# Patient Record
Sex: FEMALE | Race: WHITE | HISPANIC OR LATINO | Employment: FULL TIME | URBAN - METROPOLITAN AREA
[De-identification: names, ages, dates, MRNs, and addresses within clinical notes are randomized per-mention and may not be internally consistent; named-entity substitution may affect disease eponyms.]

---

## 2023-11-16 ENCOUNTER — APPOINTMENT (OUTPATIENT)
Dept: RADIOLOGY | Facility: MEDICAL CENTER | Age: 23
End: 2023-11-16
Attending: EMERGENCY MEDICINE
Payer: COMMERCIAL

## 2023-11-16 ENCOUNTER — HOSPITAL ENCOUNTER (EMERGENCY)
Facility: MEDICAL CENTER | Age: 23
End: 2023-11-16
Attending: EMERGENCY MEDICINE
Payer: COMMERCIAL

## 2023-11-16 VITALS
HEIGHT: 63 IN | RESPIRATION RATE: 16 BRPM | DIASTOLIC BLOOD PRESSURE: 59 MMHG | TEMPERATURE: 98.1 F | HEART RATE: 74 BPM | SYSTOLIC BLOOD PRESSURE: 108 MMHG | OXYGEN SATURATION: 97 %

## 2023-11-16 DIAGNOSIS — N83.202 CYST OF LEFT OVARY: ICD-10-CM

## 2023-11-16 DIAGNOSIS — R10.30 LOWER ABDOMINAL PAIN: ICD-10-CM

## 2023-11-16 LAB
ALBUMIN SERPL BCP-MCNC: 4.3 G/DL (ref 3.2–4.9)
ALBUMIN/GLOB SERPL: 1.2 G/DL
ALP SERPL-CCNC: 74 U/L (ref 30–99)
ALT SERPL-CCNC: 18 U/L (ref 2–50)
ANION GAP SERPL CALC-SCNC: 8 MMOL/L (ref 7–16)
APPEARANCE UR: CLEAR
AST SERPL-CCNC: 19 U/L (ref 12–45)
BASOPHILS # BLD AUTO: 0.7 % (ref 0–1.8)
BASOPHILS # BLD: 0.04 K/UL (ref 0–0.12)
BILIRUB SERPL-MCNC: 0.2 MG/DL (ref 0.1–1.5)
BILIRUB UR QL STRIP.AUTO: NEGATIVE
BUN SERPL-MCNC: 12 MG/DL (ref 8–22)
CALCIUM ALBUM COR SERPL-MCNC: 8.9 MG/DL (ref 8.5–10.5)
CALCIUM SERPL-MCNC: 9.1 MG/DL (ref 8.5–10.5)
CHLORIDE SERPL-SCNC: 103 MMOL/L (ref 96–112)
CO2 SERPL-SCNC: 25 MMOL/L (ref 20–33)
COLOR UR: YELLOW
CREAT SERPL-MCNC: 0.67 MG/DL (ref 0.5–1.4)
EKG IMPRESSION: NORMAL
EOSINOPHIL # BLD AUTO: 0.19 K/UL (ref 0–0.51)
EOSINOPHIL NFR BLD: 3.4 % (ref 0–6.9)
ERYTHROCYTE [DISTWIDTH] IN BLOOD BY AUTOMATED COUNT: 41 FL (ref 35.9–50)
GFR SERPLBLD CREATININE-BSD FMLA CKD-EPI: 124 ML/MIN/1.73 M 2
GLOBULIN SER CALC-MCNC: 3.7 G/DL (ref 1.9–3.5)
GLUCOSE SERPL-MCNC: 96 MG/DL (ref 65–99)
GLUCOSE UR STRIP.AUTO-MCNC: NEGATIVE MG/DL
HCG UR QL: NEGATIVE
HCT VFR BLD AUTO: 45.4 % (ref 37–47)
HGB BLD-MCNC: 15.3 G/DL (ref 12–16)
IMM GRANULOCYTES # BLD AUTO: 0.01 K/UL (ref 0–0.11)
IMM GRANULOCYTES NFR BLD AUTO: 0.2 % (ref 0–0.9)
KETONES UR STRIP.AUTO-MCNC: NEGATIVE MG/DL
LEUKOCYTE ESTERASE UR QL STRIP.AUTO: NEGATIVE
LIPASE SERPL-CCNC: 29 U/L (ref 11–82)
LYMPHOCYTES # BLD AUTO: 2.31 K/UL (ref 1–4.8)
LYMPHOCYTES NFR BLD: 41 % (ref 22–41)
MCH RBC QN AUTO: 29.1 PG (ref 27–33)
MCHC RBC AUTO-ENTMCNC: 33.7 G/DL (ref 32.2–35.5)
MCV RBC AUTO: 86.3 FL (ref 81.4–97.8)
MICRO URNS: NORMAL
MONOCYTES # BLD AUTO: 0.32 K/UL (ref 0–0.85)
MONOCYTES NFR BLD AUTO: 5.7 % (ref 0–13.4)
NEUTROPHILS # BLD AUTO: 2.77 K/UL (ref 1.82–7.42)
NEUTROPHILS NFR BLD: 49 % (ref 44–72)
NITRITE UR QL STRIP.AUTO: NEGATIVE
NRBC # BLD AUTO: 0 K/UL
NRBC BLD-RTO: 0 /100 WBC (ref 0–0.2)
PH UR STRIP.AUTO: 6 [PH] (ref 5–8)
PLATELET # BLD AUTO: 276 K/UL (ref 164–446)
PMV BLD AUTO: 9.7 FL (ref 9–12.9)
POTASSIUM SERPL-SCNC: 4.2 MMOL/L (ref 3.6–5.5)
PROT SERPL-MCNC: 8 G/DL (ref 6–8.2)
PROT UR QL STRIP: NEGATIVE MG/DL
RBC # BLD AUTO: 5.26 M/UL (ref 4.2–5.4)
RBC UR QL AUTO: NEGATIVE
SODIUM SERPL-SCNC: 136 MMOL/L (ref 135–145)
SP GR UR STRIP.AUTO: 1.01
TROPONIN T SERPL-MCNC: <6 NG/L (ref 6–19)
UROBILINOGEN UR STRIP.AUTO-MCNC: 0.2 MG/DL
WBC # BLD AUTO: 5.6 K/UL (ref 4.8–10.8)

## 2023-11-16 PROCEDURE — 84484 ASSAY OF TROPONIN QUANT: CPT

## 2023-11-16 PROCEDURE — 700105 HCHG RX REV CODE 258: Performed by: EMERGENCY MEDICINE

## 2023-11-16 PROCEDURE — 81025 URINE PREGNANCY TEST: CPT

## 2023-11-16 PROCEDURE — 700111 HCHG RX REV CODE 636 W/ 250 OVERRIDE (IP): Performed by: EMERGENCY MEDICINE

## 2023-11-16 PROCEDURE — 93005 ELECTROCARDIOGRAM TRACING: CPT

## 2023-11-16 PROCEDURE — 93005 ELECTROCARDIOGRAM TRACING: CPT | Performed by: EMERGENCY MEDICINE

## 2023-11-16 PROCEDURE — 700117 HCHG RX CONTRAST REV CODE 255: Performed by: EMERGENCY MEDICINE

## 2023-11-16 PROCEDURE — 80053 COMPREHEN METABOLIC PANEL: CPT

## 2023-11-16 PROCEDURE — 83690 ASSAY OF LIPASE: CPT

## 2023-11-16 PROCEDURE — 96375 TX/PRO/DX INJ NEW DRUG ADDON: CPT

## 2023-11-16 PROCEDURE — 71045 X-RAY EXAM CHEST 1 VIEW: CPT

## 2023-11-16 PROCEDURE — 96374 THER/PROPH/DIAG INJ IV PUSH: CPT

## 2023-11-16 PROCEDURE — 85025 COMPLETE CBC W/AUTO DIFF WBC: CPT

## 2023-11-16 PROCEDURE — 74177 CT ABD & PELVIS W/CONTRAST: CPT

## 2023-11-16 PROCEDURE — 99285 EMERGENCY DEPT VISIT HI MDM: CPT

## 2023-11-16 PROCEDURE — 36415 COLL VENOUS BLD VENIPUNCTURE: CPT

## 2023-11-16 PROCEDURE — 81003 URINALYSIS AUTO W/O SCOPE: CPT

## 2023-11-16 PROCEDURE — 76830 TRANSVAGINAL US NON-OB: CPT

## 2023-11-16 RX ORDER — HYDROMORPHONE HYDROCHLORIDE 1 MG/ML
0.5 INJECTION, SOLUTION INTRAMUSCULAR; INTRAVENOUS; SUBCUTANEOUS ONCE
Status: COMPLETED | OUTPATIENT
Start: 2023-11-16 | End: 2023-11-16

## 2023-11-16 RX ORDER — IBUPROFEN 600 MG/1
600 TABLET ORAL EVERY 6 HOURS PRN
Qty: 20 TABLET | Refills: 0 | Status: SHIPPED | OUTPATIENT
Start: 2023-11-16 | End: 2023-11-21

## 2023-11-16 RX ORDER — SODIUM CHLORIDE, SODIUM LACTATE, POTASSIUM CHLORIDE, CALCIUM CHLORIDE 600; 310; 30; 20 MG/100ML; MG/100ML; MG/100ML; MG/100ML
1000 INJECTION, SOLUTION INTRAVENOUS ONCE
Status: COMPLETED | OUTPATIENT
Start: 2023-11-16 | End: 2023-11-16

## 2023-11-16 RX ORDER — ONDANSETRON 4 MG/1
4 TABLET, ORALLY DISINTEGRATING ORAL EVERY 6 HOURS PRN
Qty: 20 TABLET | Refills: 0 | Status: ON HOLD | OUTPATIENT
Start: 2023-11-16 | End: 2024-03-25

## 2023-11-16 RX ORDER — OXYCODONE HYDROCHLORIDE AND ACETAMINOPHEN 5; 325 MG/1; MG/1
1 TABLET ORAL EVERY 6 HOURS PRN
Qty: 12 TABLET | Refills: 0 | Status: SHIPPED | OUTPATIENT
Start: 2023-11-16 | End: 2023-11-19

## 2023-11-16 RX ORDER — ONDANSETRON 2 MG/ML
4 INJECTION INTRAMUSCULAR; INTRAVENOUS ONCE
Status: COMPLETED | OUTPATIENT
Start: 2023-11-16 | End: 2023-11-16

## 2023-11-16 RX ADMIN — FENTANYL CITRATE 100 MCG: 50 INJECTION, SOLUTION INTRAMUSCULAR; INTRAVENOUS at 07:58

## 2023-11-16 RX ADMIN — IOHEXOL 100 ML: 350 INJECTION, SOLUTION INTRAVENOUS at 10:30

## 2023-11-16 RX ADMIN — HYDROMORPHONE HYDROCHLORIDE 0.5 MG: 1 INJECTION, SOLUTION INTRAMUSCULAR; INTRAVENOUS; SUBCUTANEOUS at 09:08

## 2023-11-16 RX ADMIN — ONDANSETRON 4 MG: 2 INJECTION INTRAMUSCULAR; INTRAVENOUS at 07:58

## 2023-11-16 RX ADMIN — SODIUM CHLORIDE, POTASSIUM CHLORIDE, SODIUM LACTATE AND CALCIUM CHLORIDE 1000 ML: 600; 310; 30; 20 INJECTION, SOLUTION INTRAVENOUS at 07:59

## 2023-11-16 ASSESSMENT — PAIN DESCRIPTION - PAIN TYPE: TYPE: ACUTE PAIN

## 2023-11-16 NOTE — ED NOTES
Patient report given to VIRGILIO Silva . Pt AAO X 4 , respirations even and unlabored, on room air . Standard Fall risk interventions in place.

## 2023-11-16 NOTE — ED NOTES
PIV removed. Patient provided discharge instructions and medication information via . Patient verbalizes understanding and denies any further questions. Patient instructed to follow up with gynecology and return if condition worsens. No distress noted.

## 2023-11-16 NOTE — ED TRIAGE NOTES
"Chief Complaint   Patient presents with    Chest Pain     Pt reports at 0300 unbearable chest pain, lower abdomen and back. Pt reports that the back pain feels likes it taring.     Abdominal Pain     Pt report lower abdominal pressure.      /75 Comment: left arm  Pulse 94   Temp 37.2 °C (98.9 °F) (Temporal)   Resp 18   Ht 1.6 m (5' 2.99\")   SpO2 99%     Pt roomed to blue 16  "

## 2023-11-16 NOTE — Clinical Note
Mamie Kamara Lambert was seen and treated in our emergency department on 11/16/2023.  She may return to work on 11/20/2023.       If you have any questions or concerns, please don't hesitate to call.      Lul Reynoso M.D.

## 2023-11-16 NOTE — ED PROVIDER NOTES
ED Provider Note    CHIEF COMPLAINT  Chief Complaint   Patient presents with    Chest Pain     Pt reports at 0300 unbearable chest pain, lower abdomen and back. Pt reports that the back pain feels likes it taring.     Abdominal Pain     Pt report lower abdominal pressure.        EXTERNAL RECORDS REVIEWED  Other none available    HPI/ROS  LIMITATION TO HISTORY   Select: Language Persian,  Used   OUTSIDE HISTORIAN(S):  Family reports she has had some pain towards the area of her  scar since her surgery in [place]    Mamie Lambert is a 23 y.o. female who presents with lower abdominal pain.  Patient reports that she was in her normal state of health when she went to bed, she woke around 3 this morning with some bilateral lower abdominal pain.  She describes it as sharp, constant, there is some radiation towards her back.  It is worse with movement but no other real alleviating or aggravating factors.  She reports some nausea although no vomiting, she reports no vaginal bleeding or discharge.  No dysuria or hematuria.  No fevers or chills.  She states she does not think she is pregnant but does not know for sure.  Last menstrual period .  She also reports that after she had the lower abdominal pain for few hours she began having some tightness in her chest.  Not really pain in her chest as in triage, this is the abdominal pain and is more of a tightness in her chest and difficulty breathing.  No leg pain or swelling, no focal weakness or numbness    PAST MEDICAL HISTORY       SURGICAL HISTORY  patient denies any surgical history    FAMILY HISTORY  No family history on file.    SOCIAL HISTORY  Social History     Tobacco Use    Smoking status: Not on file    Smokeless tobacco: Not on file   Substance and Sexual Activity    Alcohol use: Not on file    Drug use: Not on file    Sexual activity: Not on file       CURRENT MEDICATIONS  Home Medications       Reviewed by Lian YU  "DOM Alvares (Registered Nurse) on 11/16/23 at 1230  Med List Status: Partial     Medication Last Dose Status        Patient Ulisses Taking any Medications                           ALLERGIES  No Known Allergies    PHYSICAL EXAM  VITAL SIGNS: /59   Pulse 74   Temp 36.7 °C (98.1 °F) (Temporal)   Resp 16   Ht 1.6 m (5' 2.99\")   SpO2 97%      Pulse ox interpretation: I interpret this pulse ox as normal.  Constitutional: Alert uncomfortable  HENT: No signs of trauma, Bilateral external ears normal, Nose normal.   Eyes: Pupils are equal and reactive, Conjunctiva normal, Non-icteric.   Neck: Normal range of motion, No tenderness, Supple, No stridor.   Cardiovascular: Regular rate and rhythm, no murmurs.  Strong radial pulse bilaterally  Thorax & Lungs: Normal breath sounds, No respiratory distress, No wheezing, No chest tenderness.   Abdomen: Bowel sounds normal, Soft, tenderness bilateral suprapubic tenderness, No masses, No pulsatile masses.  Skin: Warm, Dry, No erythema, No rash.   Back: No bony tenderness, No CVA tenderness.   Extremities: Intact distal pulses, No edema, No tenderness, No cyanosis,  Negative Aiyana's sign.   Musculoskeletal: Good range of motion in all major joints. No tenderness to palpation or major deformities noted.   Neurologic: Alert , cranial nerves intact, no drift of the upper or lower extremities, sensation is intact to light touch throughout the upper and lower extremities normal motor function, Normal sensory function, No focal deficits noted.   Psychiatric: Affect normal, Judgment normal, Mood normal.           DIAGNOSTIC STUDIES / PROCEDURES  Results for orders placed or performed during the hospital encounter of 11/16/23   CBC WITH DIFFERENTIAL   Result Value Ref Range    WBC 5.6 4.8 - 10.8 K/uL    RBC 5.26 4.20 - 5.40 M/uL    Hemoglobin 15.3 12.0 - 16.0 g/dL    Hematocrit 45.4 37.0 - 47.0 %    MCV 86.3 81.4 - 97.8 fL    MCH 29.1 27.0 - 33.0 pg    MCHC 33.7 32.2 - 35.5 " g/dL    RDW 41.0 35.9 - 50.0 fL    Platelet Count 276 164 - 446 K/uL    MPV 9.7 9.0 - 12.9 fL    Neutrophils-Polys 49.00 44.00 - 72.00 %    Lymphocytes 41.00 22.00 - 41.00 %    Monocytes 5.70 0.00 - 13.40 %    Eosinophils 3.40 0.00 - 6.90 %    Basophils 0.70 0.00 - 1.80 %    Immature Granulocytes 0.20 0.00 - 0.90 %    Nucleated RBC 0.00 0.00 - 0.20 /100 WBC    Neutrophils (Absolute) 2.77 1.82 - 7.42 K/uL    Lymphs (Absolute) 2.31 1.00 - 4.80 K/uL    Monos (Absolute) 0.32 0.00 - 0.85 K/uL    Eos (Absolute) 0.19 0.00 - 0.51 K/uL    Baso (Absolute) 0.04 0.00 - 0.12 K/uL    Immature Granulocytes (abs) 0.01 0.00 - 0.11 K/uL    NRBC (Absolute) 0.00 K/uL   COMP METABOLIC PANEL   Result Value Ref Range    Sodium 136 135 - 145 mmol/L    Potassium 4.2 3.6 - 5.5 mmol/L    Chloride 103 96 - 112 mmol/L    Co2 25 20 - 33 mmol/L    Anion Gap 8.0 7.0 - 16.0    Glucose 96 65 - 99 mg/dL    Bun 12 8 - 22 mg/dL    Creatinine 0.67 0.50 - 1.40 mg/dL    Calcium 9.1 8.5 - 10.5 mg/dL    Correct Calcium 8.9 8.5 - 10.5 mg/dL    AST(SGOT) 19 12 - 45 U/L    ALT(SGPT) 18 2 - 50 U/L    Alkaline Phosphatase 74 30 - 99 U/L    Total Bilirubin 0.2 0.1 - 1.5 mg/dL    Albumin 4.3 3.2 - 4.9 g/dL    Total Protein 8.0 6.0 - 8.2 g/dL    Globulin 3.7 (H) 1.9 - 3.5 g/dL    A-G Ratio 1.2 g/dL   LIPASE   Result Value Ref Range    Lipase 29 11 - 82 U/L   TROPONIN   Result Value Ref Range    Troponin T <6 6 - 19 ng/L   URINALYSIS (UA)    Specimen: Urine   Result Value Ref Range    Color Yellow     Character Clear     Specific Gravity 1.008 <1.035    Ph 6.0 5.0 - 8.0    Glucose Negative Negative mg/dL    Ketones Negative Negative mg/dL    Protein Negative Negative mg/dL    Bilirubin Negative Negative    Urobilinogen, Urine 0.2 Negative    Nitrite Negative Negative    Leukocyte Esterase Negative Negative    Occult Blood Negative Negative    Micro Urine Req see below    HCG QUALITATIVE UR (Lab)   Result Value Ref Range    Beta-Hcg Urine Negative Negative    ESTIMATED GFR   Result Value Ref Range    GFR (CKD-EPI) 124 >60 mL/min/1.73 m 2   EKG   Result Value Ref Range    Report       [facility] Emergency Dept.    Test Date:  2023  Pt Name:    KELLY DE JESUS      Department: ER  MRN:        [id number]                      Room:  Gender:     Female                       Technician: [id number]  :        2000                   Requested By:ER TRIAGE PROTOCOL  Order #:    [id number]                    Reading MD:    Measurements  Intervals                                Axis  Rate:       82                           P:          57  IL:         137                          QRS:        38  QRSD:       84                           T:          8  QT:         366  QTc:        428    Interpretive Statements  Sinus rhythm  Borderline T abnormalities, anterior leads  No previous ECG available for comparison           RADIOLOGY  I have independently interpreted the diagnostic imaging associated with this visit and am waiting the final reading from the radiologist.   My preliminary interpretation is as follows: Cyst noted  Radiologist interpretation:   CT-ABDOMEN-PELVIS WITH   Final Result      1.  Small amount of intermediate density fluid adjacent to the appendiceal tip, cecum and loops of distal small bowel as well as the RIGHT adnexa. This appearance would be somewhat unusual for a ruptured tip appendicitis as no bowel wall thickening is    evident. This could be from a ruptured ovarian cyst or less likely pelvic or other peritoneal inflammatory disease.   2.  LEFT adnexal cysts better seen on recent ultrasound   3.  Subcentimeter hypodense hepatic lesion likely a cyst or hemangioma absent a history of cancer      US-PELVIC COMPLETE (TRANSABDOMINAL/TRANSVAGINAL) (COMBO)   Final Result      Prominent LEFT adnexal hemorrhagic cyst, recommend sonographic follow-up in 6 weeks to confirm resolution and exclude other pathology      DX-CHEST-PORTABLE  (1 VIEW)   Final Result      No acute process.            COURSE & MEDICAL DECISION MAKING    ED Observation Status? Yes; I am placing the patient in to an observation status due to a diagnostic uncertainty as well as therapeutic intensity. Patient placed in observation status at 12:34 PM, 11/16/2023.     Observation plan is as follows: Management of her symptoms with pain, diagnostic evaluation as below    Upon Reevaluation, the patient's condition has: Improved; and will be discharged.    Patient discharged from ED Observation status at 12:34 PM   (Time) 11/16/23   (Date).     INITIAL ASSESSMENT, COURSE AND PLAN  Care Narrative: 6:43 AM  Is evaluated bedside, at this point differential includes ovarian cyst, torsion, ureteral stone, consideration for renal insufficiency metabolic electrolyte derangement, pancreatitis.  Regarding her chest pain, I think this is more reaction to the abdominal pain but did consider ACS, consider dissection although the nature of her symptoms does not highly suggest this.  I have ordered for diagnostic labs, ultrasound, ECG, x-ray, fentanyl, Zofran and IV fluids    Patient with some return of pain, ordered for additional hydromorphone    Ultrasound has returned, she does have some continued pain in order for CT scan as well      12:10 PM  Patient is reevaluated, she reports she is much more comfortable, pain is well controlled, updated on all results and she is agreeable to discharge    HYDRATION: Based on the patient's presentation of Acute Vomiting the patient was given IV fluids. IV Hydration was used because oral hydration was not as rapid as required. Upon recheck following hydration, the patient was improved.      PROBLEM LIST  #Ovarian cyst.  Patient presenting with lower abdominal pain, and on her ultrasound as well as her CT she does have findings of ovarian cyst with likely hemorrhagic cyst and some rupture.  Hemoglobin is reassuring without symptoms suggestive of  significant blood loss, her pain has been well controlled.  There is no evidence of torsion on her ultrasound.  Patient has no vaginal discharge or infectious symptoms to suggest pelvic infectious process.  No leukocytosis fevers or other infectious symptoms to suggest appendicitis as well and her CT is not consistent with these diagnoses.  I will prescribe a short course of Percocet as well as Motrin, referral for follow-up with gynecology    #Chest tightness.  I think this was likely more a response to pain as this has resolved and she is overall well-appearing here.  ECG without any findings of ischemia or arrhythmia.  Troponin negative as well.  No persistent symptoms to suggest dissection or pulmonary embolism      DISPOSITION AND DISCUSSIONS    Barriers to care at this time, including but not limited to: Patient does not have established PCP.  Is referred to gynecology for follow-up    Decision tools and prescription drugs considered including, but not limited to: HEART Score 0 .    I reviewed prescription monitoring program for patient's narcotic use before prescribing a scheduled drug.The patient will not drink alcohol nor drive with prescribed medications. The patient will return for new or worsening symptoms and is stable at the time of discharge.    The patient is referred to a primary physician for blood pressure management, diabetic screening, and for all other preventative health concerns.    In prescribing controlled substances to this patient, I certify that I have obtained and reviewed the medical history of Mamie Lambert. I have also made a good paul effort to obtain applicable records from other providers who have treated the patient and records did not demonstrate any increased risk of substance abuse that would prevent me from prescribing controlled substances.     I have conducted a physical exam and documented it. I have reviewed Ms. Asad Lambert’s prescription history as  maintained by the [place] Prescription Monitoring Program.     I have assessed the patient’s risk for abuse, dependency, and addiction using the validated Opioid Risk Tool available at https://www.mdcalc.com/zetyeh-dqyx-rzga-ort-narcotic-abuse.     Given the above, I believe the benefits of controlled substance therapy outweigh the risks. The reasons for prescribing controlled substances include non-narcotic, oral analgesic alternatives have been inadequate for pain control. Accordingly, I have discussed the risk and benefits, treatment plan, and alternative therapies with the patient.       DISPOSITION:  Patient will be discharged home in stable condition.    FOLLOW UP:  [facility]  [address]  [place] [address]  [phone]  Schedule an appointment as soon as possible for a visit         OUTPATIENT MEDICATIONS:  New Prescriptions    IBUPROFEN (MOTRIN) 600 MG TAB    Take 1 Tablet by mouth every 6 hours as needed for Moderate Pain for up to 5 days.    ONDANSETRON (ZOFRAN ODT) 4 MG TABLET DISPERSIBLE    Take 1 Tablet by mouth every 6 hours as needed for Nausea/Vomiting.    OXYCODONE-ACETAMINOPHEN (PERCOCET) 5-325 MG TAB    Take 1 Tablet by mouth every 6 hours as needed for Moderate Pain for up to 3 days.         FINAL DIAGNOSIS  1. Cyst of left ovary    2. Lower abdominal pain           Electronically signed by: Lul Reynoso M.D., 11/16/2023 6:43 AM

## 2023-11-16 NOTE — ED NOTES
Bedside report received from VIRGILIO Hooks. Assumed care of patient and she is resting, denies any pain or needs. Bed locked and in lowest position. Call light available and within reach. No oxygen requirements at this time. Appropriate fall precautions in place. Patient A&Ox4, GCS 15. Patient on cardiac monitoring, automatic BP and pulse oximeter. See MAR for medications and infusions. No distress noted.

## 2023-11-16 NOTE — ED NOTES
Patient roomed to [place] [facility] from [facility], Assumed care of patient, patient bedside report received from VIRGILIO Carrera . Pt AAO X 4 , respirations even and unlabored, on room air  . Introduced self as pt RN, POC discussed.

## 2023-11-16 NOTE — ED NOTES
Bedside report received from off going RN/tech: Precious, assumed care of patient.  POC discussed with patient. Call light within reach, all needs addressed at this time.       Fall risk interventions in place: Move the patient closer to the nurse's station, Patient's personal possessions are with in their safe reach, Place socks on patient, and Keep floor surfaces clean and dry (all applicable per [place] Fall risk assessment)   Continuous monitoring: Cardiac Leads, Pulse Ox, or Blood Pressure  IVF/IV medications: Not Applicable   Oxygen: Room Air  Bedside sitter: Not Applicable   Isolation: Not Applicable

## 2024-01-12 ENCOUNTER — HOSPITAL ENCOUNTER (EMERGENCY)
Facility: MEDICAL CENTER | Age: 24
End: 2024-01-12
Attending: EMERGENCY MEDICINE
Payer: COMMERCIAL

## 2024-01-12 ENCOUNTER — APPOINTMENT (OUTPATIENT)
Dept: RADIOLOGY | Facility: MEDICAL CENTER | Age: 24
End: 2024-01-12
Attending: EMERGENCY MEDICINE
Payer: COMMERCIAL

## 2024-01-12 VITALS
BODY MASS INDEX: 31.56 KG/M2 | TEMPERATURE: 97.4 F | HEART RATE: 80 BPM | WEIGHT: 178.13 LBS | HEIGHT: 63 IN | DIASTOLIC BLOOD PRESSURE: 54 MMHG | RESPIRATION RATE: 16 BRPM | OXYGEN SATURATION: 100 % | SYSTOLIC BLOOD PRESSURE: 97 MMHG

## 2024-01-12 DIAGNOSIS — O20.0 THREATENED MISCARRIAGE: ICD-10-CM

## 2024-01-12 LAB
ALBUMIN SERPL BCP-MCNC: 3.9 G/DL (ref 3.2–4.9)
ALBUMIN/GLOB SERPL: 1.2 G/DL
ALP SERPL-CCNC: 57 U/L (ref 30–99)
ALT SERPL-CCNC: 15 U/L (ref 2–50)
ANION GAP SERPL CALC-SCNC: 10 MMOL/L (ref 7–16)
APPEARANCE UR: CLEAR
AST SERPL-CCNC: 16 U/L (ref 12–45)
B-HCG SERPL-ACNC: ABNORMAL MIU/ML (ref 0–5)
BASOPHILS # BLD AUTO: 0.5 % (ref 0–1.8)
BASOPHILS # BLD: 0.03 K/UL (ref 0–0.12)
BILIRUB SERPL-MCNC: 0.3 MG/DL (ref 0.1–1.5)
BILIRUB UR QL STRIP.AUTO: NEGATIVE
BUN SERPL-MCNC: 7 MG/DL (ref 8–22)
CALCIUM ALBUM COR SERPL-MCNC: 9.2 MG/DL (ref 8.5–10.5)
CALCIUM SERPL-MCNC: 9.1 MG/DL (ref 8.5–10.5)
CHLORIDE SERPL-SCNC: 102 MMOL/L (ref 96–112)
CO2 SERPL-SCNC: 23 MMOL/L (ref 20–33)
COLOR UR: YELLOW
CREAT SERPL-MCNC: 0.53 MG/DL (ref 0.5–1.4)
EOSINOPHIL # BLD AUTO: 0.17 K/UL (ref 0–0.51)
EOSINOPHIL NFR BLD: 2.7 % (ref 0–6.9)
ERYTHROCYTE [DISTWIDTH] IN BLOOD BY AUTOMATED COUNT: 40.8 FL (ref 35.9–50)
GFR SERPLBLD CREATININE-BSD FMLA CKD-EPI: 132 ML/MIN/1.73 M 2
GLOBULIN SER CALC-MCNC: 3.2 G/DL (ref 1.9–3.5)
GLUCOSE SERPL-MCNC: 78 MG/DL (ref 65–99)
GLUCOSE UR STRIP.AUTO-MCNC: NEGATIVE MG/DL
HCT VFR BLD AUTO: 43.5 % (ref 37–47)
HGB BLD-MCNC: 14.8 G/DL (ref 12–16)
IMM GRANULOCYTES # BLD AUTO: 0.02 K/UL (ref 0–0.11)
IMM GRANULOCYTES NFR BLD AUTO: 0.3 % (ref 0–0.9)
KETONES UR STRIP.AUTO-MCNC: NEGATIVE MG/DL
LEUKOCYTE ESTERASE UR QL STRIP.AUTO: NEGATIVE
LIPASE SERPL-CCNC: 24 U/L (ref 11–82)
LYMPHOCYTES # BLD AUTO: 2.08 K/UL (ref 1–4.8)
LYMPHOCYTES NFR BLD: 33.1 % (ref 22–41)
MCH RBC QN AUTO: 28.7 PG (ref 27–33)
MCHC RBC AUTO-ENTMCNC: 34 G/DL (ref 32.2–35.5)
MCV RBC AUTO: 84.3 FL (ref 81.4–97.8)
MICRO URNS: NORMAL
MONOCYTES # BLD AUTO: 0.32 K/UL (ref 0–0.85)
MONOCYTES NFR BLD AUTO: 5.1 % (ref 0–13.4)
NEUTROPHILS # BLD AUTO: 3.67 K/UL (ref 1.82–7.42)
NEUTROPHILS NFR BLD: 58.3 % (ref 44–72)
NITRITE UR QL STRIP.AUTO: NEGATIVE
NRBC # BLD AUTO: 0 K/UL
NRBC BLD-RTO: 0 /100 WBC (ref 0–0.2)
NUMBER OF RH DOSES IND 8505RD: NORMAL
PH UR STRIP.AUTO: 6 [PH] (ref 5–8)
PLATELET # BLD AUTO: 264 K/UL (ref 164–446)
PMV BLD AUTO: 10.2 FL (ref 9–12.9)
POTASSIUM SERPL-SCNC: 4.1 MMOL/L (ref 3.6–5.5)
PROT SERPL-MCNC: 7.1 G/DL (ref 6–8.2)
PROT UR QL STRIP: NEGATIVE MG/DL
RBC # BLD AUTO: 5.16 M/UL (ref 4.2–5.4)
RBC UR QL AUTO: NEGATIVE
RH BLD: NORMAL
SODIUM SERPL-SCNC: 135 MMOL/L (ref 135–145)
SP GR UR STRIP.AUTO: 1.01
UROBILINOGEN UR STRIP.AUTO-MCNC: 0.2 MG/DL
WBC # BLD AUTO: 6.3 K/UL (ref 4.8–10.8)

## 2024-01-12 PROCEDURE — 86901 BLOOD TYPING SEROLOGIC RH(D): CPT

## 2024-01-12 PROCEDURE — 99284 EMERGENCY DEPT VISIT MOD MDM: CPT

## 2024-01-12 PROCEDURE — 80053 COMPREHEN METABOLIC PANEL: CPT

## 2024-01-12 PROCEDURE — 76801 OB US < 14 WKS SINGLE FETUS: CPT

## 2024-01-12 PROCEDURE — 81003 URINALYSIS AUTO W/O SCOPE: CPT

## 2024-01-12 PROCEDURE — 85025 COMPLETE CBC W/AUTO DIFF WBC: CPT

## 2024-01-12 PROCEDURE — 36415 COLL VENOUS BLD VENIPUNCTURE: CPT

## 2024-01-12 PROCEDURE — 83690 ASSAY OF LIPASE: CPT

## 2024-01-12 PROCEDURE — 84702 CHORIONIC GONADOTROPIN TEST: CPT

## 2024-01-12 ASSESSMENT — PAIN DESCRIPTION - PAIN TYPE: TYPE: ACUTE PAIN

## 2024-01-12 ASSESSMENT — FIBROSIS 4 INDEX: FIB4 SCORE: 0.39

## 2024-01-12 NOTE — ED NOTES
Patient assisted to restroom and back without incident.     Urine collected, labelled appropriately and sent to lab via tube station

## 2024-01-12 NOTE — ED PROVIDER NOTES
"ED Provider Note    CHIEF COMPLAINT  Chief Complaint   Patient presents with    Abdominal Cramping     Pt states she was seen in [place] on Wednesday, was told that she was pregnant. US done there, instructed to f/u if any further bleeding.     Vaginal Bleeding     Reports small amount bleeding that began Monday.        EXTERNAL RECORDS REVIEWED  Other none available    HPI/ROS  LIMITATION TO HISTORY    used  OUTSIDE HISTORIAN(S):  none    Mamie Lambert is a 23 y.o. female who presents with lower abdominal cramping and vaginal spotting.  Patient reports that she was seen in [place] on Wednesday diagnosed with pregnancy had ultrasound that showed IUP although she does not think a heartbeat, yesterday she began to have some lower abdominal cramping and has had some spotting this morning.  She reports no other vaginal discharge, no nausea or vomiting, no fevers or chills.    PAST MEDICAL HISTORY       SURGICAL HISTORY  patient denies any surgical history    FAMILY HISTORY  History reviewed. No pertinent family history.    SOCIAL HISTORY  Social History     Tobacco Use    Smoking status: Never    Smokeless tobacco: Never   Substance and Sexual Activity    Alcohol use: Not Currently    Drug use: Not Currently    Sexual activity: Not on file       CURRENT MEDICATIONS  Home Medications       Reviewed by Geneva Cheema R.N. (Registered Nurse) on 01/12/24 at 1200  Med List Status: Partial     Medication Last Dose Status   ondansetron (ZOFRAN ODT) 4 MG TABLET DISPERSIBLE  Active                    ALLERGIES  No Known Allergies    PHYSICAL EXAM  VITAL SIGNS: BP 97/54   Pulse 80   Temp 36.3 °C (97.4 °F) (Temporal)   Resp 16   Ht 1.6 m (5' 3\")   Wt 80.8 kg (178 lb 2.1 oz)   LMP 11/27/2023 (Approximate)   SpO2 100%   BMI 31.55 kg/m²      Pulse ox interpretation: I interpret this pulse ox as normal.  Constitutional: Alert in no apparent distress.  HENT: No signs of trauma, Bilateral external " ears normal, Nose normal.   Eyes: Pupils are equal and reactive, Conjunctiva normal, Non-icteric.   Neck: Normal range of motion, No tenderness, Supple, No stridor.   Cardiovascular: Regular rate and rhythm, no murmurs.   Thorax & Lungs: Normal breath sounds, No respiratory distress, No wheezing, No chest tenderness.   Abdomen: Bowel sounds normal, Soft, No tenderness, No masses, No pulsatile masses. No peritoneal signs.  Skin: Warm, Dry, No erythema, No rash.   Back: No bony tenderness, No CVA tenderness.   Extremities: Intact distal pulses, No edema, No tenderness, No cyanosis,  Negative Aiyana's sign.   Musculoskeletal: Good range of motion in all major joints. No tenderness to palpation or major deformities noted.   Neurologic: Alert , Normal motor function, Normal sensory function, No focal deficits noted.   Psychiatric: Affect normal, Judgment normal, Mood normal.               DIAGNOSTIC STUDIES / PROCEDURES  Labs Reviewed   COMP METABOLIC PANEL - Abnormal; Notable for the following components:       Result Value    Bun 7 (*)     All other components within normal limits   HCG QUANTITATIVE - Abnormal; Notable for the following components:    Bhcg 76726.0 (*)     All other components within normal limits   CBC WITH DIFFERENTIAL   LIPASE   URINALYSIS,CULTURE IF INDICATED    Narrative:     Indication for culture:->Pregnant women: fever and/or  asymptomatic screening   RH TYPE FOR RHOGAM FROM E.D.    Narrative:     Print Consent?->No   ESTIMATED GFR       RADIOLOGY  I have independently interpreted the diagnostic imaging associated with this visit and am waiting the final reading from the radiologist.   My preliminary interpretation is as follows: iup  Radiologist interpretation:   US-OB 1ST TRIMESTER WITH TRANSVAGINAL (COMBO)   Final Result      1.  Single living intrauterine pregnancy at 6 weeks, 1 days estimated gestational age. JIMENEZ is 9/5/2024.   2.  Probable 2.9 cm hemorrhagic right ovarian cyst.             COURSE & MEDICAL DECISION MAKING        INITIAL ASSESSMENT, COURSE AND PLAN  Care Narrative: 1:31 PM  Patient is evaluated at the bedside, at this point differential includes threatened miscarriage implantation bleed, ectopic pregnancy urinary tract infection.  I have ordered for diagnostic labs, ultrasound    4:31 PM  Patient is reevaluated, she is comfortable, updated on all results and she is agreeable discharge          PROBLEM LIST  # Threatened miscarriage.  Patient presenting with cramping and vaginal spotting.  Ultrasound is reassuring with no ectopic pregnancy reassuring heart tones.  No findings of urinary tract infection.  No findings of other acute pathology.  Pelvic exam was deferred as she just had this done 2 days ago at [facility].  As rest, hydration, pelvic rest and she has a follow-up appointment for January 17      DISPOSITION AND DISCUSSIONS    Barriers to care at this time, including but not limited to:  none .     Decision tools and prescription drugs considered including, but not limited to: Antibiotics considered but no findings of infection or asymptomatic bacteriuria .     The patient will return for new or worsening symptoms and is stable at the time of discharge.    The patient is referred to a primary physician for blood pressure management, diabetic screening, and for all other preventative health concerns.        DISPOSITION:  Patient will be discharged home in stable condition.    FOLLOW UP:  At your OB appointment on January 17            OUTPATIENT MEDICATIONS:  New Prescriptions    No medications on file         FINAL DIAGNOSIS  1. Threatened miscarriage           Electronically signed by: Lul Reynoso M.D., 1/12/2024 1:29 PM

## 2024-01-12 NOTE — ED TRIAGE NOTES
Chief Complaint   Patient presents with    Abdominal Cramping     [place], was told that she was pregnant. US done there, instructed to f/u if any further bleeding.     Vaginal Bleeding     Reports small amount bleeding that began Monday.      Pt ambulates to triage for above. . States she is approx 6 weeks pregnant.      used for encounter: Jose [id number]

## 2024-01-13 NOTE — ED NOTES
Pt resting comfortably on gurney, call light within reach. Warm blankets provided. Pt requesting water, ERP notified.

## 2024-01-13 NOTE — ED NOTES
Discharge paperwork and teaching provided to patient regarding threatened miscarriage and all questions answered. Discharge performed using [place] tele- Perla ID# [id number]. VSS, pain/nausea assessment stable. Provided information regarding home care, follow up with OB/GYN as scheduled 2/5/24, and reasons to return to ED. Patient provided no Rx. Patient discharged to the care of self and ambulated with steady gait out of the ED with all belongings.

## 2024-01-13 NOTE — ED NOTES
Pt ambulated from lobby to [address] with steady gait, alert & oriented, calm & cooperative, moving all extremities, NAD. Accompanied by family member.  Pt placed in gown and on monitoring, call light within reach.

## 2024-02-04 ENCOUNTER — HOSPITAL ENCOUNTER (EMERGENCY)
Facility: MEDICAL CENTER | Age: 24
End: 2024-02-04
Attending: STUDENT IN AN ORGANIZED HEALTH CARE EDUCATION/TRAINING PROGRAM
Payer: COMMERCIAL

## 2024-02-04 VITALS
SYSTOLIC BLOOD PRESSURE: 107 MMHG | HEIGHT: 69 IN | TEMPERATURE: 98.2 F | HEART RATE: 84 BPM | WEIGHT: 179.68 LBS | DIASTOLIC BLOOD PRESSURE: 67 MMHG | BODY MASS INDEX: 26.61 KG/M2 | OXYGEN SATURATION: 98 % | RESPIRATION RATE: 18 BRPM

## 2024-02-04 DIAGNOSIS — R51.9 ACUTE NONINTRACTABLE HEADACHE, UNSPECIFIED HEADACHE TYPE: ICD-10-CM

## 2024-02-04 LAB
ALBUMIN SERPL BCP-MCNC: 4.1 G/DL (ref 3.2–4.9)
ALBUMIN/GLOB SERPL: 1.1 G/DL
ALP SERPL-CCNC: 58 U/L (ref 30–99)
ALT SERPL-CCNC: 27 U/L (ref 2–50)
ANION GAP SERPL CALC-SCNC: 12 MMOL/L (ref 7–16)
AST SERPL-CCNC: 22 U/L (ref 12–45)
BASOPHILS # BLD AUTO: 0.7 % (ref 0–1.8)
BASOPHILS # BLD: 0.05 K/UL (ref 0–0.12)
BILIRUB SERPL-MCNC: <0.2 MG/DL (ref 0.1–1.5)
BUN SERPL-MCNC: 8 MG/DL (ref 8–22)
CALCIUM ALBUM COR SERPL-MCNC: 9.1 MG/DL (ref 8.5–10.5)
CALCIUM SERPL-MCNC: 9.2 MG/DL (ref 8.5–10.5)
CHLORIDE SERPL-SCNC: 100 MMOL/L (ref 96–112)
CO2 SERPL-SCNC: 22 MMOL/L (ref 20–33)
CREAT SERPL-MCNC: 0.48 MG/DL (ref 0.5–1.4)
EOSINOPHIL # BLD AUTO: 0.14 K/UL (ref 0–0.51)
EOSINOPHIL NFR BLD: 1.9 % (ref 0–6.9)
ERYTHROCYTE [DISTWIDTH] IN BLOOD BY AUTOMATED COUNT: 41.4 FL (ref 35.9–50)
GFR SERPLBLD CREATININE-BSD FMLA CKD-EPI: 135 ML/MIN/1.73 M 2
GLOBULIN SER CALC-MCNC: 3.6 G/DL (ref 1.9–3.5)
GLUCOSE SERPL-MCNC: 90 MG/DL (ref 65–99)
HCT VFR BLD AUTO: 44.7 % (ref 37–47)
HGB BLD-MCNC: 15.3 G/DL (ref 12–16)
IMM GRANULOCYTES # BLD AUTO: 0.03 K/UL (ref 0–0.11)
IMM GRANULOCYTES NFR BLD AUTO: 0.4 % (ref 0–0.9)
LYMPHOCYTES # BLD AUTO: 1.8 K/UL (ref 1–4.8)
LYMPHOCYTES NFR BLD: 24.3 % (ref 22–41)
MCH RBC QN AUTO: 28.8 PG (ref 27–33)
MCHC RBC AUTO-ENTMCNC: 34.2 G/DL (ref 32.2–35.5)
MCV RBC AUTO: 84 FL (ref 81.4–97.8)
MONOCYTES # BLD AUTO: 0.44 K/UL (ref 0–0.85)
MONOCYTES NFR BLD AUTO: 5.9 % (ref 0–13.4)
NEUTROPHILS # BLD AUTO: 4.95 K/UL (ref 1.82–7.42)
NEUTROPHILS NFR BLD: 66.8 % (ref 44–72)
NRBC # BLD AUTO: 0 K/UL
NRBC BLD-RTO: 0 /100 WBC (ref 0–0.2)
PLATELET # BLD AUTO: 284 K/UL (ref 164–446)
PMV BLD AUTO: 10.1 FL (ref 9–12.9)
POTASSIUM SERPL-SCNC: 4 MMOL/L (ref 3.6–5.5)
PROT SERPL-MCNC: 7.7 G/DL (ref 6–8.2)
RBC # BLD AUTO: 5.32 M/UL (ref 4.2–5.4)
SODIUM SERPL-SCNC: 134 MMOL/L (ref 135–145)
WBC # BLD AUTO: 7.4 K/UL (ref 4.8–10.8)

## 2024-02-04 PROCEDURE — 96374 THER/PROPH/DIAG INJ IV PUSH: CPT

## 2024-02-04 PROCEDURE — 700111 HCHG RX REV CODE 636 W/ 250 OVERRIDE (IP): Performed by: STUDENT IN AN ORGANIZED HEALTH CARE EDUCATION/TRAINING PROGRAM

## 2024-02-04 PROCEDURE — 85025 COMPLETE CBC W/AUTO DIFF WBC: CPT

## 2024-02-04 PROCEDURE — 80053 COMPREHEN METABOLIC PANEL: CPT

## 2024-02-04 PROCEDURE — 99284 EMERGENCY DEPT VISIT MOD MDM: CPT

## 2024-02-04 PROCEDURE — 700105 HCHG RX REV CODE 258: Performed by: STUDENT IN AN ORGANIZED HEALTH CARE EDUCATION/TRAINING PROGRAM

## 2024-02-04 PROCEDURE — 36415 COLL VENOUS BLD VENIPUNCTURE: CPT

## 2024-02-04 RX ORDER — SODIUM CHLORIDE 9 MG/ML
INJECTION, SOLUTION INTRAVENOUS ONCE
Status: COMPLETED | OUTPATIENT
Start: 2024-02-04 | End: 2024-02-04

## 2024-02-04 RX ORDER — PROCHLORPERAZINE EDISYLATE 5 MG/ML
10 INJECTION INTRAMUSCULAR; INTRAVENOUS ONCE
Status: COMPLETED | OUTPATIENT
Start: 2024-02-04 | End: 2024-02-04

## 2024-02-04 RX ADMIN — SODIUM CHLORIDE: 9 INJECTION, SOLUTION INTRAVENOUS at 17:17

## 2024-02-04 RX ADMIN — PROCHLORPERAZINE EDISYLATE 10 MG: 5 INJECTION INTRAMUSCULAR; INTRAVENOUS at 17:18

## 2024-02-04 ASSESSMENT — LIFESTYLE VARIABLES: DO YOU DRINK ALCOHOL: NO

## 2024-02-04 ASSESSMENT — FIBROSIS 4 INDEX: FIB4 SCORE: 0.39

## 2024-02-04 ASSESSMENT — PAIN DESCRIPTION - PAIN TYPE
TYPE: ACUTE PAIN
TYPE: ACUTE PAIN

## 2024-02-04 NOTE — ED TRIAGE NOTES
"Chief Complaint   Patient presents with    Headache     Pt states she has been having a headache x 3 days. +nausea +photosensitivity  Pt is 10 weeks pregnant.        Pt to triage with steady gait for above complaint. Presents with HA with intermittent nausea, pt states when she opens her eyes she has \"flashes of lights\" otherwise no blurry or dark vision changes. Pt is 10 weeks pregnant. Pt's  reports prior to coming to the [facility], pt was told she has high cholesterol levels and is concerned about those.    Pt back to [facility], educated on triage process and encourage to alert staff of any changes.     Vitals:    02/04/24 1537   BP: 117/73   Pulse: (!) 106   Resp: 18   Temp: 36.8 °C (98.2 °F)   SpO2: 97%             #[id number]  "

## 2024-02-05 NOTE — ED NOTES
PIV placed, blood sent to lab.  IVF infusing and patient medicated per ERP orders, see MAR.    Patient resting on gurney, respirations even and unlabored.      Ipad  used for Georgian translation.

## 2024-02-05 NOTE — ED PROVIDER NOTES
"ED Provider Note    CHIEF COMPLAINT  Chief Complaint   Patient presents with    Headache     Pt states she has been having a headache x 3 days. +nausea +photosensitivity  Pt is 10 weeks pregnant.        EXTERNAL RECORDS REVIEWED  Outpatient Notes office visit on 1/16/2024 for positive pregnancy test    HPI/ROS  LIMITATION TO HISTORY   Select: : None  OUTSIDE HISTORIAN(S):    Mamie Lambert is a 24 y.o. G2, P1 female 10 weeks pregnant who presents with a headache for 3 days.  Patient reports that she does have a headache sometimes.  Patient endorses nausea and photosensitivity.  Patient denies unilateral weakness or numbness, slurred speech, facial droop.  Patient denies head trauma.  Patient denies this is the worst headache of her life.  Patient denies dysuria or hematuria or abdominal pain.    PAST MEDICAL HISTORY       SURGICAL HISTORY  patient denies any surgical history    FAMILY HISTORY  No family history on file.    SOCIAL HISTORY  Social History     Tobacco Use    Smoking status: Never    Smokeless tobacco: Never   Substance and Sexual Activity    Alcohol use: Not Currently    Drug use: Not Currently    Sexual activity: Not on file       CURRENT MEDICATIONS  Home Medications       Reviewed by Celeste Etienne R.N. (Registered Nurse) on 02/04/24 at 1545  Med List Status: Not Addressed     Medication Last Dose Status   ondansetron (ZOFRAN ODT) 4 MG TABLET DISPERSIBLE  Active                    ALLERGIES  No Known Allergies    PHYSICAL EXAM  VITAL SIGNS: /74   Pulse 86   Temp 36.8 °C (98.2 °F) (Temporal)   Resp 18   Ht 1.76 m (5' 9.29\")   Wt 81.5 kg (179 lb 10.8 oz)   LMP 11/27/2023 (Approximate)   SpO2 98%   BMI 26.31 kg/m²    Vitals and nursing note reviewed.   Constitutional:       Comments: Patient is lying in bed supine, pleasant, conversant, speaking in complete sentences   HENT:      Head: Normocephalic and atraumatic.   Eyes:      Extraocular Movements: Extraocular movements " intact.      Conjunctiva/sclera: Conjunctivae normal.      Pupils: Pupils are equal, round, and reactive to light.   Cardiovascular:      Pulses: Normal pulses.      Comments: HR 81  Pulmonary:      Effort: Pulmonary effort is normal. No respiratory distress.   Abdominal:      Comments: Abdomen is soft, nontender, nonrigid, gravid, point-of-care ultrasound demonstrates fetal heart rate of 182, good fetal movement present  Musculoskeletal:         General: No swelling. Normal range of motion.      Cervical back: Normal range of motion. No rigidity.   Skin:     General: Skin is warm and dry.      Capillary Refill: Capillary refill takes less than 2 seconds.   Neurological:      Mental Status: Alert.  Moving all extremities, no cranial nerve deficits.      DIAGNOSTIC STUDIES / PROCEDURES    LABS  Mild hyponatremia    COURSE & MEDICAL DECISION MAKING    INITIAL ASSESSMENT, COURSE AND PLAN  Care Narrative: CBC to evaluate for acute anemia and leukocytosis.  CMP to evaluate for acute electrolyte abnormality, acute kidney injury, acute liver failure or dysfunction.  IV fluids, Compazine for headache control.  Subarachnoid hemorrhage less likely at this time, patient without the worst headache of her life, not thunderclap in etiology.  Patient without neurodeficits, acute CVA also inconsistent with patient presentation at this time.  Should patient's symptoms not improve following minor intervention we may need to pursue more advanced imaging.    Electronically signed by: Keyur Phillips M.D., 2/4/2024 5:23 PM    CMP demonstrates no evidence of acute kidney injury, acute electrolyte abnormality, acute liver failure, CBC demonstrates no evidence of acute anemia or leukocytosis.  IV fluids, Compazine for symptom control, patient reports that she no longer has a headache no longer with any photophobia or other symptoms.  Patient feels much better at this time.    Repeat physical exam benign.  I doubt any serious  emergency process at this time.  Patient and/or family, friends given strict return precautions for worsening symptoms and care instructions. They have demonstrated understanding of discharge instructions through teach back mechanism. Advised PCP follow-up in 1-2 days.  Patient/family/friend expresses understanding and agrees to plan.    This dictation has been created using voice recognition software. I am continuously working with the software to minimize the number of voice recognition errors and I have made every attempt to manually correct the errors within my dictation. However errors  related to this voice recognition software may still exist and should be interpreted within the appropriate context.     Electronically signed by: Keyur Phillips M.D., 2/4/2024 6:36 PM      HYDRATION: Based on the patient's presentation of Dehydration the patient was given IV fluids. IV Hydration was used because oral hydration was not adequate alone. Upon recheck following hydration, the patient was improved.    DISPOSITION AND DISCUSSIONS      Escalation of care considered, and ultimately not performed:diagnostic imaging     Decision tools and prescription drugs considered including, but not limited to: Pain Medications   over-the-counter pain medications are appropriate, narcotics not indicated at this time  .    FINAL DIAGNOSIS  1. Acute nonintractable headache, unspecified headache type           Electronically signed by: Keyur Phillips M.D., 2/4/2024 5:21 PM

## 2024-02-05 NOTE — ED NOTES
PIV removed and bandaged.  Mamieanna Lambert discharged via ambulation with friend reportedly driving home.  Discharge instructions given and reviewed via Ipad  use, patient educated to follow up with PCP, verbalized understanding.  All personal belongings in possession.  No questions at this time.

## 2024-02-05 NOTE — ED NOTES
Patient ambulatory to BR with SBA, steady gait.  Patient reports headache pain resolved.  IVF infusing.

## 2024-03-24 ENCOUNTER — HOSPITAL ENCOUNTER (OUTPATIENT)
Dept: RADIOLOGY | Facility: MEDICAL CENTER | Age: 24
End: 2024-03-24

## 2024-03-24 ENCOUNTER — HOSPITAL ENCOUNTER (INPATIENT)
Facility: MEDICAL CENTER | Age: 24
LOS: 2 days | End: 2024-03-26
Attending: STUDENT IN AN ORGANIZED HEALTH CARE EDUCATION/TRAINING PROGRAM | Admitting: STUDENT IN AN ORGANIZED HEALTH CARE EDUCATION/TRAINING PROGRAM
Payer: COMMERCIAL

## 2024-03-24 DIAGNOSIS — Z3A.16 16 WEEKS GESTATION OF PREGNANCY: ICD-10-CM

## 2024-03-24 DIAGNOSIS — N30.00 ACUTE CYSTITIS WITHOUT HEMATURIA: ICD-10-CM

## 2024-03-24 DIAGNOSIS — Q62.11 HYDRONEPHROSIS WITH URETEROPELVIC JUNCTION (UPJ) OBSTRUCTION: ICD-10-CM

## 2024-03-24 PROBLEM — N13.30 HYDRONEPHROSIS: Status: ACTIVE | Noted: 2024-03-24

## 2024-03-24 PROBLEM — Z34.90 PREGNANCY: Status: ACTIVE | Noted: 2024-03-24

## 2024-03-24 PROCEDURE — 99223 1ST HOSP IP/OBS HIGH 75: CPT | Performed by: STUDENT IN AN ORGANIZED HEALTH CARE EDUCATION/TRAINING PROGRAM

## 2024-03-24 PROCEDURE — 99418 PROLNG IP/OBS E/M EA 15 MIN: CPT | Performed by: STUDENT IN AN ORGANIZED HEALTH CARE EDUCATION/TRAINING PROGRAM

## 2024-03-24 PROCEDURE — 770006 HCHG ROOM/CARE - MED/SURG/GYN SEMI*

## 2024-03-24 PROCEDURE — 700105 HCHG RX REV CODE 258: Performed by: STUDENT IN AN ORGANIZED HEALTH CARE EDUCATION/TRAINING PROGRAM

## 2024-03-24 RX ORDER — ONDANSETRON 2 MG/ML
4 INJECTION INTRAMUSCULAR; INTRAVENOUS EVERY 4 HOURS PRN
Status: DISCONTINUED | OUTPATIENT
Start: 2024-03-24 | End: 2024-03-26 | Stop reason: HOSPADM

## 2024-03-24 RX ORDER — ONDANSETRON 4 MG/1
4 TABLET, ORALLY DISINTEGRATING ORAL EVERY 4 HOURS PRN
Status: DISCONTINUED | OUTPATIENT
Start: 2024-03-24 | End: 2024-03-26 | Stop reason: HOSPADM

## 2024-03-24 RX ORDER — SODIUM CHLORIDE 9 MG/ML
INJECTION, SOLUTION INTRAVENOUS CONTINUOUS
Status: DISCONTINUED | OUTPATIENT
Start: 2024-03-24 | End: 2024-03-26

## 2024-03-24 RX ORDER — ACETAMINOPHEN 325 MG/1
650 TABLET ORAL EVERY 6 HOURS PRN
Status: DISCONTINUED | OUTPATIENT
Start: 2024-03-24 | End: 2024-03-26 | Stop reason: HOSPADM

## 2024-03-24 RX ORDER — PROCHLORPERAZINE EDISYLATE 5 MG/ML
5-10 INJECTION INTRAMUSCULAR; INTRAVENOUS EVERY 4 HOURS PRN
Status: DISCONTINUED | OUTPATIENT
Start: 2024-03-24 | End: 2024-03-26 | Stop reason: HOSPADM

## 2024-03-24 RX ORDER — MORPHINE SULFATE 4 MG/ML
3 INJECTION INTRAVENOUS EVERY 4 HOURS PRN
Status: DISCONTINUED | OUTPATIENT
Start: 2024-03-24 | End: 2024-03-26 | Stop reason: HOSPADM

## 2024-03-24 RX ORDER — PROMETHAZINE HYDROCHLORIDE 25 MG/1
12.5-25 TABLET ORAL EVERY 4 HOURS PRN
Status: DISCONTINUED | OUTPATIENT
Start: 2024-03-24 | End: 2024-03-26 | Stop reason: HOSPADM

## 2024-03-24 RX ORDER — PROMETHAZINE HYDROCHLORIDE 25 MG/1
12.5-25 SUPPOSITORY RECTAL EVERY 4 HOURS PRN
Status: DISCONTINUED | OUTPATIENT
Start: 2024-03-24 | End: 2024-03-26 | Stop reason: HOSPADM

## 2024-03-24 RX ORDER — INSULIN LISPRO 100 [IU]/ML
1-6 INJECTION, SOLUTION INTRAVENOUS; SUBCUTANEOUS
Status: DISCONTINUED | OUTPATIENT
Start: 2024-03-24 | End: 2024-03-24

## 2024-03-24 RX ORDER — INSULIN LISPRO 100 [IU]/ML
0.2 INJECTION, SOLUTION INTRAVENOUS; SUBCUTANEOUS
Status: DISCONTINUED | OUTPATIENT
Start: 2024-03-25 | End: 2024-03-24

## 2024-03-24 RX ADMIN — SODIUM CHLORIDE: 9 INJECTION, SOLUTION INTRAVENOUS at 20:24

## 2024-03-24 ASSESSMENT — COGNITIVE AND FUNCTIONAL STATUS - GENERAL
MOBILITY SCORE: 24
SUGGESTED CMS G CODE MODIFIER MOBILITY: CH
DAILY ACTIVITIY SCORE: 24
SUGGESTED CMS G CODE MODIFIER DAILY ACTIVITY: CH

## 2024-03-24 ASSESSMENT — LIFESTYLE VARIABLES
HAVE PEOPLE ANNOYED YOU BY CRITICIZING YOUR DRINKING: NO
TOTAL SCORE: 0
CONSUMPTION TOTAL: NEGATIVE
HOW MANY TIMES IN THE PAST YEAR HAVE YOU HAD 5 OR MORE DRINKS IN A DAY: 0
EVER HAD A DRINK FIRST THING IN THE MORNING TO STEADY YOUR NERVES TO GET RID OF A HANGOVER: NO
HAVE YOU EVER FELT YOU SHOULD CUT DOWN ON YOUR DRINKING: NO
TOTAL SCORE: 0
TOTAL SCORE: 0
ALCOHOL_USE: NO
EVER FELT BAD OR GUILTY ABOUT YOUR DRINKING: NO
AVERAGE NUMBER OF DAYS PER WEEK YOU HAVE A DRINK CONTAINING ALCOHOL: 0
ON A TYPICAL DAY WHEN YOU DRINK ALCOHOL HOW MANY DRINKS DO YOU HAVE: 0

## 2024-03-24 ASSESSMENT — ENCOUNTER SYMPTOMS
NEUROLOGICAL NEGATIVE: 1
MUSCULOSKELETAL NEGATIVE: 1
NAUSEA: 1
CHILLS: 1
PSYCHIATRIC NEGATIVE: 1
ABDOMINAL PAIN: 1
CARDIOVASCULAR NEGATIVE: 1
RESPIRATORY NEGATIVE: 1
EYES NEGATIVE: 1

## 2024-03-24 ASSESSMENT — PAIN DESCRIPTION - PAIN TYPE
TYPE: ACUTE PAIN
TYPE: ACUTE PAIN

## 2024-03-24 ASSESSMENT — PATIENT HEALTH QUESTIONNAIRE - PHQ9
1. LITTLE INTEREST OR PLEASURE IN DOING THINGS: NOT AT ALL
SUM OF ALL RESPONSES TO PHQ9 QUESTIONS 1 AND 2: 0
2. FEELING DOWN, DEPRESSED, IRRITABLE, OR HOPELESS: NOT AT ALL

## 2024-03-24 ASSESSMENT — FIBROSIS 4 INDEX: FIB4 SCORE: 0.37

## 2024-03-25 ENCOUNTER — APPOINTMENT (OUTPATIENT)
Dept: RADIOLOGY | Facility: MEDICAL CENTER | Age: 24
End: 2024-03-25
Attending: STUDENT IN AN ORGANIZED HEALTH CARE EDUCATION/TRAINING PROGRAM
Payer: COMMERCIAL

## 2024-03-25 ENCOUNTER — APPOINTMENT (OUTPATIENT)
Dept: RADIOLOGY | Facility: MEDICAL CENTER | Age: 24
End: 2024-03-25
Attending: INTERNAL MEDICINE
Payer: COMMERCIAL

## 2024-03-25 LAB
ANION GAP SERPL CALC-SCNC: 13 MMOL/L (ref 7–16)
BASOPHILS # BLD AUTO: 0.4 % (ref 0–1.8)
BASOPHILS # BLD: 0.03 K/UL (ref 0–0.12)
BUN SERPL-MCNC: 4 MG/DL (ref 8–22)
CALCIUM SERPL-MCNC: 8.6 MG/DL (ref 8.5–10.5)
CHLORIDE SERPL-SCNC: 104 MMOL/L (ref 96–112)
CO2 SERPL-SCNC: 18 MMOL/L (ref 20–33)
CREAT SERPL-MCNC: 0.38 MG/DL (ref 0.5–1.4)
EOSINOPHIL # BLD AUTO: 0.14 K/UL (ref 0–0.51)
EOSINOPHIL NFR BLD: 2 % (ref 0–6.9)
ERYTHROCYTE [DISTWIDTH] IN BLOOD BY AUTOMATED COUNT: 43.9 FL (ref 35.9–50)
GFR SERPLBLD CREATININE-BSD FMLA CKD-EPI: 142 ML/MIN/1.73 M 2
GLUCOSE SERPL-MCNC: 77 MG/DL (ref 65–99)
HCT VFR BLD AUTO: 38.4 % (ref 37–47)
HGB BLD-MCNC: 13.3 G/DL (ref 12–16)
IMM GRANULOCYTES # BLD AUTO: 0.04 K/UL (ref 0–0.11)
IMM GRANULOCYTES NFR BLD AUTO: 0.6 % (ref 0–0.9)
LYMPHOCYTES # BLD AUTO: 1.83 K/UL (ref 1–4.8)
LYMPHOCYTES NFR BLD: 25.6 % (ref 22–41)
MCH RBC QN AUTO: 29.4 PG (ref 27–33)
MCHC RBC AUTO-ENTMCNC: 34.6 G/DL (ref 32.2–35.5)
MCV RBC AUTO: 84.8 FL (ref 81.4–97.8)
MONOCYTES # BLD AUTO: 0.4 K/UL (ref 0–0.85)
MONOCYTES NFR BLD AUTO: 5.6 % (ref 0–13.4)
NEUTROPHILS # BLD AUTO: 4.7 K/UL (ref 1.82–7.42)
NEUTROPHILS NFR BLD: 65.8 % (ref 44–72)
NRBC # BLD AUTO: 0 K/UL
NRBC BLD-RTO: 0 /100 WBC (ref 0–0.2)
PLATELET # BLD AUTO: 220 K/UL (ref 164–446)
PMV BLD AUTO: 10.4 FL (ref 9–12.9)
POTASSIUM SERPL-SCNC: 3.7 MMOL/L (ref 3.6–5.5)
RBC # BLD AUTO: 4.53 M/UL (ref 4.2–5.4)
SODIUM SERPL-SCNC: 135 MMOL/L (ref 135–145)
WBC # BLD AUTO: 7.1 K/UL (ref 4.8–10.8)

## 2024-03-25 PROCEDURE — 700101 HCHG RX REV CODE 250: Performed by: STUDENT IN AN ORGANIZED HEALTH CARE EDUCATION/TRAINING PROGRAM

## 2024-03-25 PROCEDURE — 74181 MRI ABDOMEN W/O CONTRAST: CPT

## 2024-03-25 PROCEDURE — 99232 SBSQ HOSP IP/OBS MODERATE 35: CPT | Performed by: INTERNAL MEDICINE

## 2024-03-25 PROCEDURE — 80048 BASIC METABOLIC PNL TOTAL CA: CPT

## 2024-03-25 PROCEDURE — 700111 HCHG RX REV CODE 636 W/ 250 OVERRIDE (IP): Performed by: INTERNAL MEDICINE

## 2024-03-25 PROCEDURE — 85025 COMPLETE CBC W/AUTO DIFF WBC: CPT

## 2024-03-25 PROCEDURE — 700102 HCHG RX REV CODE 250 W/ 637 OVERRIDE(OP): Performed by: STUDENT IN AN ORGANIZED HEALTH CARE EDUCATION/TRAINING PROGRAM

## 2024-03-25 PROCEDURE — 36415 COLL VENOUS BLD VENIPUNCTURE: CPT

## 2024-03-25 PROCEDURE — 770006 HCHG ROOM/CARE - MED/SURG/GYN SEMI*

## 2024-03-25 PROCEDURE — 700105 HCHG RX REV CODE 258: Performed by: STUDENT IN AN ORGANIZED HEALTH CARE EDUCATION/TRAINING PROGRAM

## 2024-03-25 PROCEDURE — 700111 HCHG RX REV CODE 636 W/ 250 OVERRIDE (IP): Performed by: STUDENT IN AN ORGANIZED HEALTH CARE EDUCATION/TRAINING PROGRAM

## 2024-03-25 PROCEDURE — A9270 NON-COVERED ITEM OR SERVICE: HCPCS | Performed by: STUDENT IN AN ORGANIZED HEALTH CARE EDUCATION/TRAINING PROGRAM

## 2024-03-25 RX ORDER — LORAZEPAM 2 MG/ML
0.5 INJECTION INTRAMUSCULAR ONCE
Status: DISCONTINUED | OUTPATIENT
Start: 2024-03-25 | End: 2024-03-25

## 2024-03-25 RX ORDER — PHENAZOPYRIDINE HYDROCHLORIDE 200 MG/1
200 TABLET, FILM COATED ORAL 3 TIMES DAILY PRN
COMMUNITY
Start: 2024-03-19

## 2024-03-25 RX ORDER — DIPHENHYDRAMINE HYDROCHLORIDE 50 MG/ML
25 INJECTION INTRAMUSCULAR; INTRAVENOUS ONCE
Status: COMPLETED | OUTPATIENT
Start: 2024-03-25 | End: 2024-03-25

## 2024-03-25 RX ORDER — CEPHALEXIN 500 MG/1
1000 CAPSULE ORAL 3 TIMES DAILY
Status: ON HOLD | COMMUNITY
Start: 2024-03-19 | End: 2024-03-26

## 2024-03-25 RX ADMIN — SODIUM CHLORIDE: 9 INJECTION, SOLUTION INTRAVENOUS at 22:57

## 2024-03-25 RX ADMIN — DIPHENHYDRAMINE HYDROCHLORIDE 25 MG: 50 INJECTION, SOLUTION INTRAMUSCULAR; INTRAVENOUS at 13:12

## 2024-03-25 RX ADMIN — CEFTRIAXONE SODIUM 1000 MG: 10 INJECTION, POWDER, FOR SOLUTION INTRAVENOUS at 05:18

## 2024-03-25 RX ADMIN — ACETAMINOPHEN 650 MG: 325 TABLET, FILM COATED ORAL at 11:26

## 2024-03-25 ASSESSMENT — ENCOUNTER SYMPTOMS
DIZZINESS: 0
BACK PAIN: 0
WEAKNESS: 1
SHORTNESS OF BREATH: 0
DIAPHORESIS: 0
MYALGIAS: 0
SPEECH CHANGE: 0
CHILLS: 0
HEADACHES: 0
DEPRESSION: 0
FOCAL WEAKNESS: 0
PALPITATIONS: 0
FLANK PAIN: 0
NAUSEA: 0
SENSORY CHANGE: 0
HEARTBURN: 0
VOMITING: 0
MEMORY LOSS: 0
FEVER: 0
NERVOUS/ANXIOUS: 0
COUGH: 0
ABDOMINAL PAIN: 0

## 2024-03-25 ASSESSMENT — PAIN DESCRIPTION - PAIN TYPE
TYPE: ACUTE PAIN

## 2024-03-25 NOTE — PROGRESS NOTES
Hospital Medicine Daily Progress Note    Date of Service  3/25/2024    Chief Complaint  Mamie Lambert is a 24 y.o. female admitted 3/24/2024 with     Hospital Course    Mamie Lambert is a 24 y.o. female who presented with UTI/hydronephrosis.  Patient is currently 16 weeks pregnant.  1 she was seen at outside facility ER on March 19 and was diagnosed with a UTI.  She was prescribed Keflex/Pyridium.  She has taken the medication, however continues to report right flank pain, dysuria. She began to have hematuria today, prompting her to come into ER.     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      OB ultrasound showing:  Single live intrauterine gestation, no sonographic evidence of subchorionic hemorrhage, cystic lesion associated with the left maternal ovary, consistent with a corpus luteal cyst.      Patient was given a dose of ceftriaxone and transferred to [facility].     Patient was transferred to [facility] for MRI/urology consult.    Interval Problem Update  3/25 patient denies abdominal or flank pain, no dysuria  N.p.o. for MRI today  urology follow-up    I have discussed this patient's plan of care and discharge plan at IDT rounds today with Case Management, Nursing, Nursing leadership, and other members of the IDT team.    Consultants/Specialty  urology    Code Status  Full Code    Disposition  The patient is not medically cleared for discharge to home or a post-acute  facility.      I have placed the appropriate orders for post-discharge needs.    Review of Systems  Review of Systems   Constitutional:  Negative for chills, diaphoresis, fever and malaise/fatigue.   HENT:  Negative for congestion and hearing loss.    Respiratory:  Negative for cough and shortness of breath.    Cardiovascular:  Negative for chest pain, palpitations and leg swelling.   Gastrointestinal:  Negative for abdominal pain, heartburn, nausea and vomiting.   Genitourinary:  Negative for dysuria, flank pain and urgency.   Musculoskeletal:  Negative for back pain, joint pain and myalgias.   Neurological:  Positive for weakness. Negative for dizziness, sensory change, speech change, focal weakness and headaches.   Psychiatric/Behavioral:  Negative for depression and memory loss. The patient is not nervous/anxious.         Physical Exam  Temp:  [36.3 °C (97.4 °F)-37.1 °C (98.8 °F)] 36.3 °C (97.4 °F)  Pulse:  [83-89] 83  Resp:  [19] 19  BP: (123-124)/(66-71) 123/66  SpO2:  [98 %-99 %] 99 %    Physical Exam  Vitals and nursing note reviewed.   Constitutional:       General: She is not in acute distress.     Appearance: She is not ill-appearing or diaphoretic.   HENT:      Head: Normocephalic and atraumatic.      Nose: Nose normal.   Eyes:      General:         Right eye: No discharge.         Left eye: No discharge.      Extraocular Movements: Extraocular movements intact.      Pupils: Pupils are equal, round, and reactive to light.   Neck:      Thyroid: No thyromegaly.      Vascular: No JVD.   Cardiovascular:      Rate and Rhythm: Normal rate.      Heart sounds: No murmur heard.  Pulmonary:      Effort: Pulmonary effort is normal. No respiratory distress.      Breath sounds: Normal breath sounds. No wheezing.   Abdominal:      General: Bowel sounds are normal. There is no distension.      Palpations: Abdomen is soft.      Tenderness: There is no abdominal tenderness.   Musculoskeletal:         General: No swelling  or tenderness.      Cervical back: Neck supple.   Skin:     General: Skin is warm and dry.      Coloration: Skin is not pale.      Findings: No erythema or rash.   Neurological:      Mental Status: She is alert and oriented to person, place, and time.      Cranial Nerves: No cranial nerve deficit.      Sensory: No sensory deficit.      Motor: Weakness present.      Coordination: Coordination normal.   Psychiatric:         Behavior: Behavior normal.         Thought Content: Thought content normal.         Fluids    Intake/Output Summary (Last 24 hours) at 3/25/2024 1152  Last data filed at 3/25/2024 0600  Gross per 24 hour   Intake 1200 ml   Output --   Net 1200 ml       Laboratory                        Imaging  [id number]    (Results Pending)        Assessment/Plan  * Hydronephrosis  Assessment & Plan  3/24 Renal ultrasound showing moderate to severe right-sided hydronephrosis/pyelocaliectasis with no evidence of calculus  UA suggestive of UTI, given ceftriaxone at outside facility  MRI abdomen ordered as urgent  Continue ceftriaxone  Urology consulted, requested NPO midnight  Fluids  Zofran    3/25 npo for urology eval  pending MRI  Ativan ordered for sedation  Monitor      Pregnancy  Assessment & Plan  Currently 16 weeks pregnant  OB ultrasound at outside facility showing single live intrauterine gestation         VTE prophylaxis:   SCDs/TEDs      I have performed a physical exam and reviewed and updated ROS and Plan today (3/25/2024). In review of yesterday's note (3/24/2024), there are no changes except as documented above.

## 2024-03-25 NOTE — PROGRESS NOTES
Med rec is complete per Patient at bedside with Iphone .    Allergies reviewed.    Has patient had any outside antibiotics in the last 30 days? Y stopped after coming into ER.    Any Anticoagulants (rivaroxaban, apixaban, edoxaban, dabigatran, warfarin, enoxaparin) taken in the last 14 days? N         Pharmacy/Pharmacies Pt utilizes : [facility] [phone]

## 2024-03-25 NOTE — PROGRESS NOTES
4 Eyes Skin Assessment Completed by VIRGILIO Gould and VIRGILIO Siddiqi.    Head WDL  Ears WDL  Nose WDL  Mouth WDL  Neck WDL  Breast/Chest WDL  Shoulder Blades WDL  Spine WDL  (R) Arm/Elbow/Hand WDL  (L) Arm/Elbow/Hand WDL  Abdomen WDL  Groin WDL  Scrotum/Coccyx/Buttocks WDL  (R) Leg WDL  (L) Leg WDL  (R) Heel/Foot/Toe WDL  (L) Heel/Foot/Toe WDL          Devices In Places None      Interventions In Place Pillows and Pressure Redistribution Mattress    Possible Skin Injury No    Pictures Uploaded Into Epic N/A  Wound Consult Placed N/A  RN Wound Prevention Protocol Ordered No

## 2024-03-25 NOTE — PROGRESS NOTES
Received report from Mar POOLE in ER at [facility] at this time. Awaiting patient arrival

## 2024-03-25 NOTE — PROGRESS NOTES
Patient arrived to the floor at this time, mother is with her. A+Ox4, on room air. Took bedside report from EMS [facility], they report stable transfer and Sinus Tach, /77. Patient with no distress at this time. Oriented to floor, in room 527-2.

## 2024-03-25 NOTE — PROGRESS NOTES
Received report and assumed care of patient at change of shift. Patient is A&Ox4, on RA, and denies pain at this time. Consent signed and placed on accordion folder at nurses station. IV fluids started per MAR. 2 RN skin check completed. Patient assessment completed, bed in lowest position, and call light and personal belongings are within reach. Patient expressed no further needs at this time.

## 2024-03-25 NOTE — CONSULTS
"UROLOGY Consult Note:    Addis Burgess P.A.-C.  Date & Time note created:    3/25/2024   8:43 AM     Referring MD:  Dr. Banerjee    Patient ID:   Name:             Mamie Kirk     YOB: 2000  Age:                 24 y.o.  female   MRN:               [id number]                                                             Reason for Consult:      hydronephrosis    History of Present Illness:    Patient is a 24yoF who is currently 16 weeks gestation, seen recently at outside facility ER last week for dysuria and was diagnosed with a UTI, for which she was prescribed Keflex. She has taken the medication, however began to develop right flank pain, RLQ pain, persistent dysuria, and \"spotting\" of blood. She denies any history of kidney stones or urological problems in the past.     Currently, patient is resting in bed in NAD. Reports a mild R flank and RLQ pain, controlled. She denies F/C/N/V.     RN provided Luxembourgish translation for my encounter today.    Review of Systems:      Constitutional: Denies fevers, Denies weight changes  Eyes: Denies changes in vision, no eye pain  Ears/Nose/Throat/Mouth: Denies nasal congestion or sore throat   Cardiovascular: no chest pain, no palpitations   Respiratory: no shortness of breath , Denies cough  Gastrointestinal/Hepatic: Denies abdominal pain, nausea, vomiting, diarrhea, constipation or GI bleeding   Genitourinary: +R flank pain, +hematuria, +dysuria   Skin: Denies rash  Neurological: Denies headache, confusion, memory loss or focal weakness/parasthesias  Psychiatric: denies mood disorder   Endocrine: Frances thyroid problems  Heme/Oncology/Lymph Nodes: Denies enlarged lymph nodes, denies brusing or known bleeding disorder  All other systems were reviewed and are negative (AMA/CMS criteria)                Past Medical History:   No past medical history on file.  Active Hospital Problems    Diagnosis     Hydronephrosis [N13.30]     Pregnancy [Z34.90]  "       Past Surgical History:  No past surgical history on file.    Hospital Medications:    Current Facility-Administered Medications:     NS infusion, , Intravenous, Continuous, Dain Banerjee M.D., Last Rate: 125 mL/hr at 03/24/24 2024, New Bag at 03/24/24 2024    acetaminophen (Tylenol) tablet 650 mg, 650 mg, Oral, Q6HRS PRN, Dain Banerjee M.D.    ondansetron (Zofran) syringe/vial injection 4 mg, 4 mg, Intravenous, Q4HRS PRN, Dain Banerjee M.D.    ondansetron (Zofran ODT) dispertab 4 mg, 4 mg, Oral, Q4HRS PRN, Dain Banerjee M.D.    promethazine (Phenergan) tablet 12.5-25 mg, 12.5-25 mg, Oral, Q4HRS PRN, Dain Banerjee M.D.    promethazine (Phenergan) suppository 12.5-25 mg, 12.5-25 mg, Rectal, Q4HRS PRN, Dain Banerjee M.D.    prochlorperazine (Compazine) injection 5-10 mg, 5-10 mg, Intravenous, Q4HRS PRN, Dain Banerjee M.D.    morphine 4 MG/ML injection 3 mg, 3 mg, Intravenous, Q4HRS PRN, Dain Banerjee M.D.    cefTRIAXone (Rocephin) syringe 1,000 mg, 1,000 mg, Intravenous, Q24HRS, Dain Banerjee M.D., 1,000 mg at 03/25/24 0518    Current Outpatient Medications:  Medications Prior to Admission   Medication Sig Dispense Refill Last Dose    ondansetron (ZOFRAN ODT) 4 MG TABLET DISPERSIBLE Take 1 Tablet by mouth every 6 hours as needed for Nausea/Vomiting. 20 Tablet 0        Medication Allergy:  No Known Allergies    Family History:  No family history on file.    Social History:  Social History     Socioeconomic History    Marital status:      Spouse name: Not on file    Number of children: Not on file    Years of education: Not on file    Highest education level: Not on file   Occupational History    Not on file   Tobacco Use    Smoking status: Never    Smokeless tobacco: Never   Substance and Sexual Activity    Alcohol use: Not Currently    Drug use: Not Currently    Sexual activity: Not on file   Other Topics Concern    Not on file   Social History Narrative    Not  on file     Social Determinants of Health     Financial Resource Strain: Not on file   Food Insecurity: Not on file   Transportation Needs: Not on file   Physical Activity: Not on file   Stress: Not on file   Social Connections: Not on file   Intimate Partner Violence: Not on file   Housing Stability: Not on file         Physical Exam:  Vitals/ General Appearance:   Weight/BMI: Body mass index is 26.21 kg/m².  /66   Pulse 83   Temp 36.3 °C (97.4 °F) (Temporal)   Resp 19   Wt 81.2 kg (179 lb)   SpO2 99%   Vitals:    24 1809 24 1920 24 0417   BP:  124/71 123/66   Pulse:  89 83   Resp:  19 19   Temp:  37.1 °C (98.8 °F) 36.3 °C (97.4 °F)   TempSrc:  Temporal Temporal   SpO2:  98% 99%   Weight: 81.2 kg (179 lb)       Oxygen Therapy:  Pulse Oximetry: 99 %, O2 Delivery Device: None - Room Air    Constitutional:   Well developed, Well nourished, No acute distress  HENMT:  Normocephalic, Atraumatic  Eyes:  EOMI  Neck:  Normal range of motion  Lungs:  Normal respiratory effort  Skin: Warm, Dry, No erythema, No rash, no induration.  Neurologic: Alert & oriented x 3, No focal deficits noted  Psychiatric: Affect normal, Judgment normal, Mood normal.      MDM (Data Review):     Records reviewed and summarized in current documentation    Lab Data Review:  No results found for this or any previous visit (from the past 24 hour(s)).    Imaging/Procedures Review:    Reviewed    MDM (Assessment and Plan):     Active Hospital Problems    Diagnosis     Hydronephrosis [N13.30]     Pregnancy [Z34.90]      24yoF with evidence of R hydronephrosis on VALE imaging, pending MRI for further evaluation of possible urolithiasis in the setting of UTI.     WBC normal 7.4  Creat 0.48  No UA on file  Currently on Rocephin, was on PO Keflex prior to this admission  AFVSS    VALE (done on 3/24/24) demonstrates the followin. Moderate-severe right-sided hydronephrosis/pyelocaliectasis.   2. No sonographic evidence of  nephrolithiasis.     RN at bedside with MRI safety screening form    PLAN:    - Await MRI results to evaluate for possible urolithiasis  - Continue antibiotics per primary team  - Continue pain control  - Appreciate this consult, urology following    Dr. Cr and Dr. Beck are aware of this consultation and have directed the plan of care.    Addis Burgess PA-C  [facility] [place]

## 2024-03-25 NOTE — H&P
Hospital Medicine History & Physical Note    Date of Service  3/24/2024    Primary Care Physician  Pcp Pt States None    Consultants  Urology    Code Status  Full Code    Chief Complaint  Hydronephrosis    History of Presenting Illness  Mamie Lambert is a 24 y.o. female who presented with UTI/hydronephrosis.  Patient is currently 16 weeks pregnant.  1 she was seen at outside facility ER on March 19 and was diagnosed with a UTI.  She was prescribed Keflex/Pyridium.  She has taken the medication, however continues to report right flank pain, dysuria. She began to have hematuria today, prompting her to come into ER.    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     OB ultrasound showing:  Single live intrauterine gestation, no sonographic evidence of subchorionic hemorrhage, cystic lesion associated with the left maternal ovary, consistent with a corpus luteal cyst.     Patient was given a dose of ceftriaxone and transferred to [facility].    Patient was transferred to [facility] for MRI/urology consult.    I discussed the plan of care with patient.    Review of Systems  Review of Systems   Constitutional:  Positive for chills and malaise/fatigue.   HENT: Negative.     Eyes: Negative.    Respiratory: Negative.     Cardiovascular: Negative.    Gastrointestinal:  Positive for abdominal pain and nausea.   Genitourinary: Negative.    Musculoskeletal: Negative.    Skin: Negative.    Neurological: Negative.   "  Endo/Heme/Allergies: Negative.    Psychiatric/Behavioral: Negative.         Past Medical History   has no past medical history on file.    Surgical History   has no past surgical history on file.     Family History  family history is not on file.   Family history reviewed with patient. There is no family history that is pertinent to the chief complaint.     Social History   reports that she has never smoked. She has never used smokeless tobacco. She reports that she does not currently use alcohol. She reports that she does not currently use drugs.    Allergies  No Known Allergies    Medications  Cannot display prior to admission medications because the patient has not been admitted in this contact.       Physical Exam  BP: ()/()                           Physical Exam  Constitutional:       Appearance: Normal appearance. She is normal weight.   HENT:      Head: Normocephalic.      Nose: Nose normal.      Mouth/Throat:      Mouth: Mucous membranes are moist.   Eyes:      Pupils: Pupils are equal, round, and reactive to light.   Cardiovascular:      Rate and Rhythm: Normal rate and regular rhythm.      Pulses: Normal pulses.   Pulmonary:      Effort: Pulmonary effort is normal.      Breath sounds: Normal breath sounds.   Abdominal:      General: Abdomen is flat. Bowel sounds are normal.      Palpations: Abdomen is soft.   Musculoskeletal:         General: Normal range of motion.      Cervical back: Neck supple.   Skin:     General: Skin is warm.   Neurological:      General: No focal deficit present.      Mental Status: She is alert and oriented to person, place, and time. Mental status is at baseline.   Psychiatric:         Mood and Affect: Mood normal.         Behavior: Behavior normal.         Thought Content: Thought content normal.         Judgment: Judgment normal.         Laboratory:          No results for input(s): \"ALTSGPT\", \"ASTSGOT\", \"ALKPHOSPHAT\", \"TBILIRUBIN\", \"DBILIRUBIN\", \"GAMMAGT\", \"AMYLASE\", " "\"LIPASE\", \"ALB\", \"PREALBUMIN\", \"GLUCOSE\" in the last 72 hours.      No results for input(s): \"NTPROBNP\" in the last 72 hours.      No results for input(s): \"TROPONINT\" in the last 72 hours.    Imaging:  No orders to display       no X-Ray or EKG requiring interpretation    Assessment/Plan:  Justification for Admission Status  I anticipate this patient will require at least two midnights for appropriate medical management, necessitating inpatient admission because pt has severe hydronephrosis    Patient will need a Med/Surg bed on MEDICAL service .  The need is secondary to hydronephrosis.    * Hydronephrosis  Assessment & Plan  Renal ultrasound showing moderate to severe right-sided hydronephrosis/pyelocaliectasis with no evidence of calculus  UA suggestive of UTI, given ceftriaxone at outside facility  MRI abdomen ordered as urgent  Continue ceftriaxone  Urology consulted, requested NPO midnight  Fluids  Zofran    Pregnancy  Assessment & Plan  Currently 16 weeks pregnant  OB ultrasound at outside facility showing single live intrauterine gestation        VTE prophylaxis: pharmacologic prophylaxis contraindicated due to intervention by urology    Total time spent on visit 90 minutes reviewing records from outside facility, speaking with consultants, discussing diagnosis, condition, treatment, risk and benefits, prognosis  "

## 2024-03-25 NOTE — CARE PLAN
The patient is Stable - Low risk of patient condition declining or worsening    Shift Goals  Clinical Goals: Patient will receive IV antibiotics as ordered. Patient will receive IV fluids as ordered.  Patient Goals: Patient will be able to sleep comfortably throughout the night.    Progress made toward(s) clinical / shift goals:  Patient has IV antibiotics scheduled for 0600. Patient received IV fluids as ordered to maintain adequate hydration and fluid balance. Patient slept comfortably throughout the night.    Patient is not progressing towards the following goals:

## 2024-03-25 NOTE — ASSESSMENT & PLAN NOTE
3/24 Renal ultrasound showing moderate to severe right-sided hydronephrosis/pyelocaliectasis with no evidence of calculus  UA suggestive of UTI, given ceftriaxone at outside facility  MRI abdomen ordered as urgent  Continue ceftriaxone  Urology consulted, requested NPO midnight  Fluids  Zofran    3/25 npo for urology eval  pending MRI  Ativan ordered for sedation  Monitor

## 2024-03-26 ENCOUNTER — PHARMACY VISIT (OUTPATIENT)
Dept: PHARMACY | Facility: MEDICAL CENTER | Age: 24
End: 2024-03-26
Payer: COMMERCIAL

## 2024-03-26 ENCOUNTER — APPOINTMENT (OUTPATIENT)
Dept: RADIOLOGY | Facility: MEDICAL CENTER | Age: 24
End: 2024-03-26
Attending: INTERNAL MEDICINE
Payer: COMMERCIAL

## 2024-03-26 VITALS
SYSTOLIC BLOOD PRESSURE: 104 MMHG | BODY MASS INDEX: 26.21 KG/M2 | OXYGEN SATURATION: 100 % | WEIGHT: 179 LBS | DIASTOLIC BLOOD PRESSURE: 59 MMHG | HEART RATE: 95 BPM | TEMPERATURE: 97.1 F | RESPIRATION RATE: 18 BRPM

## 2024-03-26 PROBLEM — N30.00 ACUTE CYSTITIS WITHOUT HEMATURIA: Status: ACTIVE | Noted: 2024-03-26

## 2024-03-26 LAB
ALBUMIN SERPL BCP-MCNC: 3.2 G/DL (ref 3.2–4.9)
ALBUMIN/GLOB SERPL: 1.2 G/DL
ALP SERPL-CCNC: 49 U/L (ref 30–99)
ALT SERPL-CCNC: 11 U/L (ref 2–50)
ANION GAP SERPL CALC-SCNC: 10 MMOL/L (ref 7–16)
AST SERPL-CCNC: 13 U/L (ref 12–45)
BASOPHILS # BLD AUTO: 0.7 % (ref 0–1.8)
BASOPHILS # BLD: 0.04 K/UL (ref 0–0.12)
BILIRUB SERPL-MCNC: <0.2 MG/DL (ref 0.1–1.5)
BUN SERPL-MCNC: 5 MG/DL (ref 8–22)
CALCIUM ALBUM COR SERPL-MCNC: 8.6 MG/DL (ref 8.5–10.5)
CALCIUM SERPL-MCNC: 8 MG/DL (ref 8.5–10.5)
CHLORIDE SERPL-SCNC: 104 MMOL/L (ref 96–112)
CO2 SERPL-SCNC: 20 MMOL/L (ref 20–33)
CREAT SERPL-MCNC: 0.34 MG/DL (ref 0.5–1.4)
EOSINOPHIL # BLD AUTO: 0.22 K/UL (ref 0–0.51)
EOSINOPHIL NFR BLD: 3.7 % (ref 0–6.9)
ERYTHROCYTE [DISTWIDTH] IN BLOOD BY AUTOMATED COUNT: 45.4 FL (ref 35.9–50)
GFR SERPLBLD CREATININE-BSD FMLA CKD-EPI: 146 ML/MIN/1.73 M 2
GLOBULIN SER CALC-MCNC: 2.6 G/DL (ref 1.9–3.5)
GLUCOSE SERPL-MCNC: 91 MG/DL (ref 65–99)
HCT VFR BLD AUTO: 36.6 % (ref 37–47)
HGB BLD-MCNC: 12.1 G/DL (ref 12–16)
IMM GRANULOCYTES # BLD AUTO: 0.02 K/UL (ref 0–0.11)
IMM GRANULOCYTES NFR BLD AUTO: 0.3 % (ref 0–0.9)
LYMPHOCYTES # BLD AUTO: 1.93 K/UL (ref 1–4.8)
LYMPHOCYTES NFR BLD: 32.3 % (ref 22–41)
MCH RBC QN AUTO: 28.7 PG (ref 27–33)
MCHC RBC AUTO-ENTMCNC: 33.1 G/DL (ref 32.2–35.5)
MCV RBC AUTO: 86.9 FL (ref 81.4–97.8)
MONOCYTES # BLD AUTO: 0.5 K/UL (ref 0–0.85)
MONOCYTES NFR BLD AUTO: 8.4 % (ref 0–13.4)
NEUTROPHILS # BLD AUTO: 3.26 K/UL (ref 1.82–7.42)
NEUTROPHILS NFR BLD: 54.6 % (ref 44–72)
NRBC # BLD AUTO: 0 K/UL
NRBC BLD-RTO: 0 /100 WBC (ref 0–0.2)
PLATELET # BLD AUTO: 208 K/UL (ref 164–446)
PMV BLD AUTO: 10.4 FL (ref 9–12.9)
POTASSIUM SERPL-SCNC: 3.6 MMOL/L (ref 3.6–5.5)
PROT SERPL-MCNC: 5.8 G/DL (ref 6–8.2)
RBC # BLD AUTO: 4.21 M/UL (ref 4.2–5.4)
SODIUM SERPL-SCNC: 134 MMOL/L (ref 135–145)
WBC # BLD AUTO: 6 K/UL (ref 4.8–10.8)

## 2024-03-26 PROCEDURE — 99239 HOSP IP/OBS DSCHRG MGMT >30: CPT | Performed by: INTERNAL MEDICINE

## 2024-03-26 PROCEDURE — RXMED WILLOW AMBULATORY MEDICATION CHARGE: Performed by: INTERNAL MEDICINE

## 2024-03-26 PROCEDURE — 700111 HCHG RX REV CODE 636 W/ 250 OVERRIDE (IP): Performed by: STUDENT IN AN ORGANIZED HEALTH CARE EDUCATION/TRAINING PROGRAM

## 2024-03-26 PROCEDURE — 700105 HCHG RX REV CODE 258: Performed by: STUDENT IN AN ORGANIZED HEALTH CARE EDUCATION/TRAINING PROGRAM

## 2024-03-26 PROCEDURE — 80053 COMPREHEN METABOLIC PANEL: CPT

## 2024-03-26 PROCEDURE — 700101 HCHG RX REV CODE 250: Performed by: STUDENT IN AN ORGANIZED HEALTH CARE EDUCATION/TRAINING PROGRAM

## 2024-03-26 PROCEDURE — 36415 COLL VENOUS BLD VENIPUNCTURE: CPT

## 2024-03-26 PROCEDURE — 85025 COMPLETE CBC W/AUTO DIFF WBC: CPT

## 2024-03-26 RX ORDER — CEPHALEXIN 500 MG/1
1000 CAPSULE ORAL 3 TIMES DAILY
Status: DISCONTINUED | OUTPATIENT
Start: 2024-03-27 | End: 2024-03-26 | Stop reason: HOSPADM

## 2024-03-26 RX ORDER — CEPHALEXIN 500 MG/1
500 CAPSULE ORAL 3 TIMES DAILY
Qty: 15 CAPSULE | Refills: 0 | Status: SHIPPED | OUTPATIENT
Start: 2024-03-26 | End: 2024-03-26

## 2024-03-26 RX ORDER — CEPHALEXIN 500 MG/1
1000 CAPSULE ORAL 3 TIMES DAILY
Qty: 42 CAPSULE | Refills: 0 | Status: ACTIVE | OUTPATIENT
Start: 2024-03-27 | End: 2024-04-03

## 2024-03-26 RX ADMIN — SODIUM CHLORIDE: 9 INJECTION, SOLUTION INTRAVENOUS at 09:15

## 2024-03-26 RX ADMIN — CEFTRIAXONE SODIUM 1000 MG: 10 INJECTION, POWDER, FOR SOLUTION INTRAVENOUS at 04:56

## 2024-03-26 ASSESSMENT — ENCOUNTER SYMPTOMS
NAUSEA: 0
VOMITING: 0
COUGH: 0
FEVER: 0
DEPRESSION: 0
CHILLS: 0

## 2024-03-26 ASSESSMENT — PAIN DESCRIPTION - PAIN TYPE
TYPE: ACUTE PAIN

## 2024-03-26 NOTE — CARE PLAN
The patient is Stable - Low risk of patient condition declining or worsening    Shift Goals  Clinical Goals: pending urology POC; remain free from falls; IVFl; monitor UOP  Patient Goals: POC  Family Goals: n/a at this time    Progress made toward(s) clinical / shift goals:  met     Problem: Knowledge Deficit - Standard  Goal: Patient and family/care givers will demonstrate understanding of plan of care, disease process/condition, diagnostic tests and medications  Outcome: Met     Problem: Pain - Standard  Goal: Alleviation of pain or a reduction in pain to the patient’s comfort goal  Outcome: Met     Problem: Urinary Elimination  Goal: Establish and maintain regular urinary output  Outcome: Met       Patient is not progressing towards the following goals:

## 2024-03-26 NOTE — CARE PLAN
The patient is Stable - Low risk of patient condition declining or worsening    Shift Goals  Clinical Goals: MRI abdomen; IVf; monitor UOP  Patient Goals: MRI abdomen  Family Goals: MRI abdomen    NOTE: pt denies dysuria/hematuria. Pt denied pain throughout shift. Pt and family up to date with POC  Progress made toward(s) clinical / shift goals:  progressing    Problem: Knowledge Deficit - Standard  Goal: Patient and family/care givers will demonstrate understanding of plan of care, disease process/condition, diagnostic tests and medications  Outcome: Progressing     Problem: Pain - Standard  Goal: Alleviation of pain or a reduction in pain to the patient’s comfort goal  Outcome: Progressing     Problem: Urinary Elimination  Goal: Establish and maintain regular urinary output  Outcome: Progressing       Patient is not progressing towards the following goals:

## 2024-03-26 NOTE — PROGRESS NOTES
Assumed care of patient at 1900. Received report from day RN. Patient A&Ox4, on RA, Reporting a pain level of 0/10. Call light within reach, belongings within reach, fall precautions in place, bed in lowest position. Patient does not have any other needs at this time. Pt family present at bedside.      POC was discussed with patient. All questions were answered. Patient verbalized understanding.

## 2024-03-26 NOTE — THERAPY
Physical Therapy Contact Note    Patient Name: Mamie Lambert  Age:  24 y.o., Sex:  female  Medical Record #: [id number]  Today's Date: 3/26/2024       03/26/24 0933   Interdisciplinary Plan of Care Collaboration   Collaboration Comments PT consult received & chart reviewed.  Per chart pt has 24/24 6 clicks score, RN reported pt is up-self, independent with mobility & at her functional baseline.  Acute PT eval not indicated, will DC order.

## 2024-03-26 NOTE — DISCHARGE INSTRUCTIONS
Discharge Instructions per Dr. Mando Ford D.O.    DIET: Diet Order Diet: Regular    ACTIVITY: As tolerated    A proper diet that is low in grease, fat, and salt, along with 30 minutes of exercise per day will lead to weight loss, and better controlled blood sugar and blood pressure.    DIAGNOSIS: Hydronephrosis    Follow up with your Primary Care Provider Pcp as scheduled or sooner if your symptoms persist or worsen.  Return to Emergency Room for sever chest pain, shortness of breath, signs of a stroke, or any other emergencies.

## 2024-03-26 NOTE — DISCHARGE SUMMARY
Discharge Summary    CHIEF COMPLAINT ON ADMISSION  No chief complaint on file.      Reason for Admission  Obstructing nephrolithiasis     Admission Date  3/24/2024    CODE STATUS  Full Code    HPI & HOSPITAL COURSE    Mamie Lambert is a 24 y.o. female who presented with UTI/hydronephrosis.  Patient is currently 16 weeks pregnant.  1 she was seen at outside facility ER on March 19 and was diagnosed with a UTI.  She was prescribed Keflex/Pyridium.  Reports she took 2 days of the medication and her symptoms resolved so she stopped taking her antibiotics.  She had recurrence of her dysuria and flank pain on Sunday.  She began to have hematuria today, prompting her to come into ER.     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      B ultrasound showing:  Single live intrauterine gestation, no sonographic evidence of subchorionic hemorrhage, cystic lesion associated with the left maternal ovary, consistent with a corpus luteal cyst.      Patient was given a dose of ceftriaxone and transferred to [facility].     Patient was transferred to [facility] for MRI/urology consult.     MRI abdomen showed prominent right renal pelvis and right ureter are nonspecific and can be pregnancy related.  No obvious filling defect in the distal ureter to suggest a stone.     There was no obstructive stone.  Urology recommended no surgical intervention.  Hydronephrosis likely secondary to pregnancy.     Transition from  ceftriaxone back to cephalexin.  Counseled on importance of completing course of antibiotics even after symptoms resolve.     Medically stable to discharge home.  Follow-up with primary care, OB as outpatient.    Therefore, she is discharged in fair and stable condition to home with close outpatient follow-up.    The patient met 2-midnight criteria for an inpatient stay at the time of discharge.    Discharge Date  3/26/2024      FOLLOW UP ITEMS POST DISCHARGE  None    DISCHARGE DIAGNOSES  Principal Problem:    Hydronephrosis (POA: Yes)  Active Problems:    Pregnancy (POA: Yes)    Acute cystitis without hematuria (POA: Unknown)  Resolved Problems:    * No resolved hospital problems. *      FOLLOW UP  Follow-up with primary care, OB as outpatient    MEDICATIONS ON DISCHARGE     Medication List        CHANGE how you take these medications        Instructions   cephALEXin 500 MG Caps  Start taking on: March 27, 2024  What changed:   when to take this  additional instructions  Commonly known as: Keflex   Take 2 Capsules by mouth in the morning, at noon, and at bedtime for 7 days.  Dose: 1,000 mg            CONTINUE taking these medications        Instructions   phenazopyridine 200 MG Tabs  Commonly known as: Pyridium   Take 200 mg by mouth 3 times a day as needed for Mild Pain.  Dose: 200 mg     UNISOM PO   Take 1 Tablet by mouth at bedtime.  Dose: 1 Tablet              Allergies  No Known Allergies    DIET  Orders Placed This Encounter   Procedures    Diet Order Diet: Regular     Standing Status:   Standing     Number of Occurrences:   1     Order Specific Question:   Diet:     Answer:   Regular [1]       ACTIVITY  As tolerated.  Weight bearing as tolerated    CONSULTATIONS  Urology    PROCEDURES  None    LABORATORY  Lab Results   Component Value Date    SODIUM 134 (L) 03/26/2024    POTASSIUM 3.6 03/26/2024    CHLORIDE 104 03/26/2024    CO2 20 03/26/2024    GLUCOSE 91 03/26/2024    BUN 5 (L) 03/26/2024    CREATININE  0.34 (L) 03/26/2024        Lab Results   Component Value Date    WBC 6.0 03/26/2024    HEMOGLOBIN 12.1 03/26/2024    HEMATOCRIT 36.6 (L) 03/26/2024    PLATELETCT 208 03/26/2024        I discussed medications and side effects with the patient.  I discussed prognosis and importance of medical compliance with the patient.  I counseled the patient about diet, exercise, weight loss, smoking cessation, and life style modifications.  All questions and concerns have been addressed.  Total time of the discharge process was 36 minutes.

## 2024-03-26 NOTE — PROGRESS NOTES
"       Note to reader: this note follows the APSO format rather than the historical SOAP format. Assessment and plan located at the top of the note for ease of use.    Chief Complaint  24 y.o. year old female 16 weeks gestation here with hydronephrosis and UTI     Assessment/Plan  Interval History       Plan:  Continue abx per primary team  Urology will follow up out pt to continue to monitor for hydronephrosis  No further urologic interventions planned at this time   Urology signing off         Case discussed with Dr. Beck who has directed this plan of care      Patient seen and examined. Pt feels well today. Pt has no hx of kidney stones in the past. Labs normal. No pain. MRI with \"Prominent right renal pelvis and right ureter are nonspecific and could be pregnancy related. No obvious filling defect in the distal ureter to suggest a stone although evaluation is limited due to lack of IV contrast and motion.\"            Review of Systems  Physical Exam   Review of Systems   Constitutional:  Negative for chills and fever.   Respiratory:  Negative for cough.    Cardiovascular:  Negative for chest pain.   Gastrointestinal:  Negative for nausea and vomiting.   Genitourinary: Negative.    Skin:  Negative for rash.   Psychiatric/Behavioral:  Negative for depression.    All other systems reviewed and are negative.    Vitals:    03/25/24 0417 03/25/24 1605 03/25/24 1946 03/26/24 0439   BP: 123/66 115/69 105/70 92/55   Pulse: 83 90 100 89   Resp: 19 16 16 17   Temp: 36.3 °C (97.4 °F) 36.2 °C (97.1 °F) 36.1 °C (97 °F) 36 °C (96.8 °F)   TempSrc: Temporal Temporal Temporal Temporal   SpO2: 99% 100% 98% 99%   Weight:         Physical Exam  Vitals and nursing note reviewed. Exam conducted with a chaperone present.   Constitutional:       Appearance: Normal appearance.   HENT:      Head: Normocephalic.      Nose: Nose normal.   Pulmonary:      Effort: Pulmonary effort is normal.   Skin:     General: Skin is warm. "   Neurological:      General: No focal deficit present.      Mental Status: She is alert.   Psychiatric:         Mood and Affect: Mood normal.          Hematology Chemistry   Lab Results   Component Value Date/Time    WBC 6.0 03/26/2024 03:04 AM    HEMOGLOBIN 12.1 03/26/2024 03:04 AM    HEMATOCRIT 36.6 (L) 03/26/2024 03:04 AM    PLATELETCT 208 03/26/2024 03:04 AM     Lab Results   Component Value Date/Time    SODIUM 134 (L) 03/26/2024 03:04 AM    POTASSIUM 3.6 03/26/2024 03:04 AM    CHLORIDE 104 03/26/2024 03:04 AM    CO2 20 03/26/2024 03:04 AM    GLUCOSE 91 03/26/2024 03:04 AM    BUN 5 (L) 03/26/2024 03:04 AM    CREATININE 0.34 (L) 03/26/2024 03:04 AM         Labs not explicitly included in this progress note were reviewed by the author.   Radiology/imaging not explicitly included in this progress note was reviewed by the author.     Medications reviewed, Radiology images reviewed and Labs reviewed                     YESICA Alejandro-AURORA   [id number] [place] [id number]   [phone]

## 2024-03-26 NOTE — PROGRESS NOTES
Name:  Natividad   ID Number:  [id number]    Content Discussed:  Plan of care  Questions answered   General shift assessment

## 2024-03-26 NOTE — CARE PLAN
The patient is Stable - Low risk of patient condition declining or worsening    Shift Goals  Clinical Goals: Pt will receive IV abx and IV fluids as ordered.  Patient Goals: Pt will be able to sleep comfortably throughout the night with no complaints of pain.  Family Goals: Updates on POC.    Progress made toward(s) clinical / shift goals:      Pt receiving IV rocephin q24h per MAR. Pt receiving NS at 125 mL/hr throughout the night to help maintain adequate hydration. Pt declines pain and interventions. Pt sleeping comfortably throughout the night.  on iPad used to help update the pt and family on POC. Pt states normal urinary elimination.    Problem: Knowledge Deficit - Standard  Goal: Patient and family/care givers will demonstrate understanding of plan of care, disease process/condition, diagnostic tests and medications  Outcome: Progressing     Problem: Pain - Standard  Goal: Alleviation of pain or a reduction in pain to the patient’s comfort goal  Outcome: Progressing     Problem: Urinary Elimination  Goal: Establish and maintain regular urinary output  Outcome: Progressing       Patient is not progressing towards the following goals:

## 2024-03-26 NOTE — PROGRESS NOTES
March 26, 2024     Patient: Mamie Lambert   YOB: 2000   Date of Visit: 3/24/2024 to March 26, 2024         To Whom It May Concern:     Mamie Lambert was seen and treated at a [facility]. Below is the provider's order indicating when it is safe for her to return to work/school.     Patient can return to work on Friday, 3/29/2024.  She should have light duty and be able to rest and sit

## 2024-06-13 NOTE — ASSESSMENT & PLAN NOTE
Currently 16 weeks pregnant  OB ultrasound at outside facility showing single live intrauterine gestation   Patient called because her bleeding was getting better, but today is heavier again.  States that she is changing pads 4-5 times per day.  Passing some clots, but reports approximately quarter sized and stringy.    No other symptoms at this time.    Discussed bleeding precautions.  Will call back if additional concerns

## 2024-07-29 ENCOUNTER — HOSPITAL ENCOUNTER (EMERGENCY)
Facility: MEDICAL CENTER | Age: 24
End: 2024-07-29
Attending: OBSTETRICS & GYNECOLOGY | Admitting: OBSTETRICS & GYNECOLOGY
Payer: COMMERCIAL

## 2024-07-29 VITALS
OXYGEN SATURATION: 98 % | HEIGHT: 69 IN | TEMPERATURE: 98 F | RESPIRATION RATE: 18 BRPM | BODY MASS INDEX: 32.44 KG/M2 | DIASTOLIC BLOOD PRESSURE: 56 MMHG | WEIGHT: 219 LBS | HEART RATE: 110 BPM | SYSTOLIC BLOOD PRESSURE: 124 MMHG

## 2024-07-29 LAB
APPEARANCE UR: CLEAR
COLOR UR AUTO: YELLOW
GLUCOSE UR QL STRIP.AUTO: NEGATIVE MG/DL
KETONES UR QL STRIP.AUTO: NEGATIVE MG/DL
LEUKOCYTE ESTERASE UR QL STRIP.AUTO: NEGATIVE
NITRITE UR QL STRIP.AUTO: NEGATIVE
PH UR STRIP.AUTO: 6.5 [PH] (ref 5–8)
PROT UR QL STRIP: ABNORMAL MG/DL
RBC UR QL AUTO: NEGATIVE
SP GR UR STRIP.AUTO: 1.02 (ref 1–1.03)

## 2024-07-29 PROCEDURE — 59025 FETAL NON-STRESS TEST: CPT

## 2024-07-29 PROCEDURE — 59025 FETAL NON-STRESS TEST: CPT | Mod: 26 | Performed by: STUDENT IN AN ORGANIZED HEALTH CARE EDUCATION/TRAINING PROGRAM

## 2024-07-29 PROCEDURE — 99283 EMERGENCY DEPT VISIT LOW MDM: CPT

## 2024-07-29 PROCEDURE — 700111 HCHG RX REV CODE 636 W/ 250 OVERRIDE (IP): Performed by: STUDENT IN AN ORGANIZED HEALTH CARE EDUCATION/TRAINING PROGRAM

## 2024-07-29 PROCEDURE — 99283 EMERGENCY DEPT VISIT LOW MDM: CPT | Mod: 25 | Performed by: STUDENT IN AN ORGANIZED HEALTH CARE EDUCATION/TRAINING PROGRAM

## 2024-07-29 PROCEDURE — 81002 URINALYSIS NONAUTO W/O SCOPE: CPT

## 2024-07-29 RX ORDER — ONDANSETRON 4 MG/1
4 TABLET, ORALLY DISINTEGRATING ORAL ONCE
Status: COMPLETED | OUTPATIENT
Start: 2024-07-29 | End: 2024-07-29

## 2024-07-29 RX ADMIN — ONDANSETRON 4 MG: 4 TABLET, ORALLY DISINTEGRATING ORAL at 11:41

## 2024-07-29 ASSESSMENT — FIBROSIS 4 INDEX: FIB4 SCORE: 0.47

## 2024-07-29 NOTE — DISCHARGE INSTRUCTIONS
Parto prematuro   Labor  El embarazo tiene generalmente arlyn duración de 39 a 41 semanas. Se llama trabajo de parto prematuro cuando tere se inicia antes de las 37 semanas de embarazo. Los bebés que nacen demasiado temprano pueden tener un mayor riesgo de padecer problemas a orlin plazo, doc parálisis cerebral o retrasos en el desarrollo. También pueden tener problemas poco después del nacimiento, doc problemas con el azúcar en la jagruti, la temperatura corporal, el corazón y la respiración. Estos problemas pueden ser muy graves en los bebés que nacen antes de las 34 semanas de embarazo.  ¿Cuáles son las causas?  Se desconoce la causa de esta afección.  ¿Qué incrementa el riesgo?  Es más probable que tenga un trabajo de parto prematuro si:  Tiene problemas de doreen o los tuvo.  Tiene problemas en el embarazo en curso o los tuvo en embarazos anteriores.  Tiene problemas de estilo de sekou.  Antecedentes médicos  Tiene problemas en el útero.  Tiene arlyn infección, incluidas las que se contagian a través de las relaciones sexuales.  Tiene problemas que no se resuelven, por ejemplo:  Coágulos de jagruti.  Presión arterial key.  Nivel elevado de azúcar en la jagruti.  Tiene un peso corporal bajo o demasiado peso corporal.  Embarazo en curso y embarazos anteriores  Ha tenido trabajo de parto prematuro anteriormente.  Está embarazada de dos o más bebés.  Tiene arlyn afección en la que la placenta cubre el charlotte uterino.  Esperó menos de 18 meses entre un parto y un nuevo embarazo.  El bebé en gestación tiene algunos problemas.  Tiene sangrado vaginal.  Quedó embarazada por un método llamado fertilización in vitro (FIV).  Estilo de sekou  Fuma.  Marci alcohol.  Consume drogas.  Tiene estrés.  Recibe maltrato en el hogar.  Entra en contacto con sustancias químicas que dañan el cuerpo (contaminantes).  Otros factores  Es dena de 17 años o mayor de 35 años.  ¿Cuáles son los signos o síntomas?  Los síntomas de esta afección  incluyen:  Calambres. Los calambres pueden sentirse doc cólicos de un período menstrual. También puede tener heces líquidas (diarrea).  Dolor de vientre (abdomen).  Dolor en la lola lumbar.  Contracciones regulares. Siente doc si el vientre se endurece.  Presión en la parte baja del vientre.  Más pérdida de líquido de la vagina. El líquido puede ser acuoso o con jagruti.  Ruptura de la bolsa de lu.  ¿Cómo se trata?  El tratamiento de esta afección depende de lara doreen, la doreen del bebé y el tiempo que lleva de embarazo. Puede incluir lo siguiente:  Pippa medicamentos doc, por ejemplo:  Medicamentos hormonales.  Medicamentos para detener las contracciones.  Medicamentos que ayudan a madurar los pulmones del bebé.  Medicamentos para evitar que el bebé tenga parálisis cerebral u otros problemas.  Reposo en cama. Si el trabajo de parto se inicia antes de las 34 semanas de embarazo, es posible que deba hospitalizarse.  Producir el nacimiento del bebé.  Siga estas instrucciones en lara casa:    No fume ni consuma ningún producto que contenga nicotina o tabaco. Si necesita ayuda para dejar de fumar, consulte al médico.  No jamil alcohol.  Use los medicamentos de venta melquiades y los recetados solamente doc se lo haya indicado el médico.  Lauryn reposo doc se lo haya indicado el médico.  Retome chelsea actividades normales cuando el médico le diga que es seguro.  Concurra a todas las visitas de seguimiento.  ¿Cómo se previene?  Para tener un embarazo saludable:  No consuma drogas.  No tome ningún medicamento si el médico no se lo indicó.  Consulte al médico antes de empezar a pippa cualquier suplemento de hierbas.  Asegúrese de aumentar de peso doc corresponde.  Tenga cuidado con las infecciones. Si erik que puede tener arlyn infección, consulte al médico para que la revisen inmediatamente. Los síntomas de infección pueden incluir los siguientes:  Fiebre.  Secreción vaginal con mal olor o que no es normal.  Dolor o ardor al hacer  pis.  Necesidad urgente de hacer pis.  Necesidad de hacer pis con frecuencia.  Hacer poca cantidad de pis con mucha frecuencia.  Ric en el pis.  Hacer pis con mal olor u olor atípico.  Dónde buscar más información  U.S. Department of Health and Human Services, Office on Women's Health (Oficina para la Margi de la Lyssa del Departamento de Margi y Servicios Humanos de los . UU.): www.womenshealth.gov  American College of Obstetricians and Gynecologists (Colegio Estadounidense de Obstetras y Ginecólogos): www.acog.org  Centers for Disease Control and Prevention (Centros para el Control y la Prevención de Enfermedades): www.cdc.gov  Comuníquese con un médico si:  Piensa que está teniendo un trabajo de parto prematuro.  Tiene síntomas de trabajo de parto prematuro.  Tiene síntomas de infección.  Solicite ayuda de inmediato si:  Tiene contracciones dolorosas cada 5 minutos o menos.  Rompe la bolsa.  Resumen  Se llama trabajo de parto prematuro cuando se inicia antes de las 37 semanas de embarazo.  El bebé puede tener problemas si nace antes de tiempo.  Es más probable que tenga un trabajo de parto prematuro si tiene ciertos problemas médicos o problemas con un embarazo ahora o si los tuvo en el pasado. Algunos factores en el estilo de sekou también pueden aumentar el riesgo.  Comuníquese con un médico si tiene síntomas de trabajo de parto prematuro.  Esta información no tiene doc fin reemplazar el consejo del médico. Asegúrese de hacerle al médico cualquier pregunta que tenga.  Document Revised: 01/11/2022 Document Reviewed: 01/11/2022  Elsevier Patient Education © 2023 Elsevier Inc.

## 2024-07-29 NOTE — PROGRESS NOTES
EDC 2024   EGA    35w0d    1044: TOCO/US applied, pt educated. Interperter services used to communicate with patient, [place], [place], [id number]. Pt presents with c/o lower abdominal pain that comes and goes and started 2 days ago. Pt declines LOF, has had scant pinkish VB, and states +FM. Pt receives care in [place], [place] advised to come to [facility] due to gestational age of 35 weeks. POC discussed, all questions and concerns addressed.     1124: SVE closed/thick/high, orders received to discharge patient home after dose of zofran, have patient follow up with OB provider in [place]. Dr. Burgess reviewed FHT's.     1142: Discharge instructions gone over with patient, using  services, [place], [place] [id number], all questions and concerns addressed. Pt to follow up with OB provider in [place].

## 2024-07-29 NOTE — ED PROVIDER NOTES
OB ED EVALUATION NOTE    SUBJECTIVE:  Mamie Lambert is a 24 y.o.,  at 35w0d who presents for evaluation of cramping pain for two days. She reports pelvic and low back pain a/w tightening of her abdominal muscles. She reports this is occurring every three minutes and has been going on for two days. She had two episodes of small amount of vaginal spotting and denies persistent or heavy VB. She denies LOF and reports baby is moving. She reports some nausea over past 2 days without emesis. She reports normal PO intake.     The patient's prenatal care has been with Isaiah Adkins and she is scheduled for a repeat C/S at 39w0d. She was advised to have labor rule out here due to GA.    Denies fevers, chills, new H/A, vision changes, CP, dyspnea, cough, emesis, dysuria, vaginal discharge, vulvar/vaginal irritation, or other new/concerning symptoms.    I personally reviewed the past medical and surgical histories, as well as the problem list.    Patient Active Problem List   Diagnosis    Hydronephrosis    Pregnancy    Acute cystitis without hematuria         OBJECTIVE:  Vital Signs:   Vitals:    24 1046   BP: 124/56   Pulse: (!) 110   Resp: 18   Temp: 36.7 °C (98 °F)   SpO2: 98%     GEN: NAD, alert and conversant  HEENT: MMM  CV: mild tachycardia, regular rhythm, cap refill <2 sec  Resp: unlabored, symmetric chest rise  Abd: soft, NT, gravid  Ext: no edema    Pelvic:   SVE: closed/thick/high and very posterior    NST read: 155/moderate variability/accelerations present/decelerations absent  G[place]: irritability and rare contractions.    LABS / IMAGING:  Results for orders placed or performed during the hospital encounter of 24   POCT urinalysis device results   Result Value Ref Range    POC Color Yellow     POC Appearance Clear     POC Glucose Negative Negative mg/dL    POC Ketones Negative Negative mg/dL    POC Specific Gravity 1.025 1.005 - 1.030    POC Blood Negative Negative    POC Urine PH 6.5 5.0  - 8.0    POC Protein Trace (A) Negative mg/dL    POC Nitrites Negative Negative    POC Leukocyte Esterase Negative Negative         ASSESSMENT AND PLAN:  24 y.o.  at 35w0d  presenting for evaluation of cramping pain and spotting that occurred yesterday.   NST reactive.  - rare contraction with irritability on tocometer  - cervix closed, thick, high, and posterior  - offered PO ondansetron for nausea    Advised acetaminophen 1,000 mg Q6H PRN for pain and position changes.   Follow up with OB as scheduled.  Encourage adequate fluid intake.    The patient was instructed to follow up in the office at next scheduled visit.  She was counseled to call or return for vaginal bleeding, regular contractions, leakage of fluid or decreased fetal movement.    Rianna Burgess M.D.

## 2024-09-03 ENCOUNTER — HOSPITAL ENCOUNTER (EMERGENCY)
Facility: MEDICAL CENTER | Age: 24
End: 2024-09-04
Attending: STUDENT IN AN ORGANIZED HEALTH CARE EDUCATION/TRAINING PROGRAM | Admitting: STUDENT IN AN ORGANIZED HEALTH CARE EDUCATION/TRAINING PROGRAM
Payer: COMMERCIAL

## 2024-09-03 VITALS
HEART RATE: 96 BPM | DIASTOLIC BLOOD PRESSURE: 73 MMHG | HEIGHT: 69 IN | TEMPERATURE: 97.2 F | WEIGHT: 200 LBS | RESPIRATION RATE: 18 BRPM | BODY MASS INDEX: 29.62 KG/M2 | SYSTOLIC BLOOD PRESSURE: 123 MMHG | OXYGEN SATURATION: 97 %

## 2024-09-03 PROCEDURE — 302449 STATCHG TRIAGE ONLY (STATISTIC)

## 2024-09-03 PROCEDURE — 700102 HCHG RX REV CODE 250 W/ 637 OVERRIDE(OP): Performed by: NURSE PRACTITIONER

## 2024-09-03 PROCEDURE — A9270 NON-COVERED ITEM OR SERVICE: HCPCS | Performed by: NURSE PRACTITIONER

## 2024-09-03 RX ORDER — OXYCODONE AND ACETAMINOPHEN 5; 325 MG/1; MG/1
1 TABLET ORAL ONCE
Status: COMPLETED | OUTPATIENT
Start: 2024-09-03 | End: 2024-09-03

## 2024-09-03 RX ADMIN — OXYCODONE AND ACETAMINOPHEN 1 TABLET: 5; 325 TABLET ORAL at 23:40

## 2024-09-03 ASSESSMENT — FIBROSIS 4 INDEX: FIB4 SCORE: 0.47

## 2024-09-04 ENCOUNTER — PHARMACY VISIT (OUTPATIENT)
Dept: PHARMACY | Facility: MEDICAL CENTER | Age: 24
End: 2024-09-04
Payer: COMMERCIAL

## 2024-09-04 PROCEDURE — RXMED WILLOW AMBULATORY MEDICATION CHARGE: Performed by: NURSE PRACTITIONER

## 2024-09-04 RX ORDER — OXYCODONE AND ACETAMINOPHEN 5; 325 MG/1; MG/1
1 TABLET ORAL EVERY 4 HOURS PRN
Qty: 10 TABLET | Refills: 0 | Status: SHIPPED | OUTPATIENT
Start: 2024-09-04 | End: 2024-09-11

## 2024-09-04 NOTE — ED PROVIDER NOTES
OB ED EVALUATION NOTE    SUBJECTIVE:  Mamie Lambert is a 24 y.o.,  now post op day 12 after C/S on 24 due to a hx of a c/s and desiring a repeat, who presents for pain 9/10 over her incision area not relieved by 800mg of ibuprofen and 1000mg of tylenol this evening. She was not discharged with narcotics. She denies any fevers/chills, excessive bleeding, N/V, constipation. Her pain worsened in the past two days and last night she noticed some yellowish/clear fluid come out of her incision.     I personally reviewed the past medical and surgical histories, as well as the problem list.    Patient Active Problem List   Diagnosis    Hydronephrosis    Pregnancy    Acute cystitis without hematuria         OBJECTIVE:  Vital Signs:   Vitals:    24 2309   BP: 123/73   Pulse: 96   Resp: 18   Temp: 36.2 °C (97.2 °F)   SpO2: 97%     GEN: NAD  Abd: soft, mild tenderness noted over incisional area but no edema, erythema, active leaking from incision. Steri strips partially fallen off so removed completely.   Ext: no edema    Pelvic:   deferred    LABS / IMAGING:  Results for orders placed or performed during the hospital encounter of 24   POCT urinalysis device results   Result Value Ref Range    POC Color Yellow     POC Appearance Clear     POC Glucose Negative Negative mg/dL    POC Ketones Negative Negative mg/dL    POC Specific Gravity 1.025 1.005 - 1.030    POC Blood Negative Negative    POC Urine PH 6.5 5.0 - 8.0    POC Protein Trace (A) Negative mg/dL    POC Nitrites Negative Negative    POC Leukocyte Esterase Negative Negative         ASSESSMENT AND PLAN:  24 y.o.  luisito Day 12 post op after c/s on 24 in [place]  #Percocet 5mg now; pain reduced to 2/10  #Incision noted to be intact and steri strips removed  #No areas of infection or dehiscence noted   #Pt instructed to begin washing incision gently with warm water and gentle soap daily and placing a feminine pad over incision to keep  area dry; also encouraged to use a belly binder for healing purposes  #Has f/u on Thurs in [place] for her post-op  #Instructed on when to return to LnD including increasing pain or with s/sx of infection     NICOLE Underwood.

## 2024-09-04 NOTE — PROGRESS NOTES
0015 discharge orders received from Shila MCMAHON    0018 discharge instructions reviewed with pt. All questions answered.     0020 pt discharged home.    All communication performed by this RN through  services.

## 2024-09-04 NOTE — PROGRESS NOTES
24 y.o     Pt arrives to Labor and Delivery 12 days PP (24) via C/S in [place] for incisional site pain and LOF from site that was clear and yellow. Pt states she took tylenol 1g and Ibuprofen 800mg around 2000 tonight and it did not help. She rates the pain a 9/10 upon arrival tonight. Shila MCMAHON updated on pt status. Order received to give Oxycodone 5mg and CNM to come to bedside to assess incision site. POC discussed. All questions and concerns addressed at this time with ipad interpretor.

## 2024-09-30 ENCOUNTER — APPOINTMENT (OUTPATIENT)
Dept: RADIOLOGY | Facility: MEDICAL CENTER | Age: 24
End: 2024-09-30
Attending: EMERGENCY MEDICINE
Payer: COMMERCIAL

## 2024-09-30 ENCOUNTER — HOSPITAL ENCOUNTER (EMERGENCY)
Facility: MEDICAL CENTER | Age: 24
End: 2024-09-30
Attending: EMERGENCY MEDICINE
Payer: COMMERCIAL

## 2024-09-30 VITALS
RESPIRATION RATE: 15 BRPM | TEMPERATURE: 98.3 F | HEART RATE: 51 BPM | WEIGHT: 183.2 LBS | HEIGHT: 68 IN | SYSTOLIC BLOOD PRESSURE: 101 MMHG | OXYGEN SATURATION: 93 % | BODY MASS INDEX: 27.77 KG/M2 | DIASTOLIC BLOOD PRESSURE: 59 MMHG

## 2024-09-30 DIAGNOSIS — R10.10 UPPER ABDOMINAL PAIN: ICD-10-CM

## 2024-09-30 DIAGNOSIS — R07.9 ACUTE CHEST PAIN: ICD-10-CM

## 2024-09-30 LAB
ALBUMIN SERPL BCP-MCNC: 4 G/DL (ref 3.2–4.9)
ALBUMIN/GLOB SERPL: 1.3 G/DL
ALP SERPL-CCNC: 102 U/L (ref 30–99)
ALT SERPL-CCNC: 28 U/L (ref 2–50)
ANION GAP SERPL CALC-SCNC: 10 MMOL/L (ref 7–16)
APPEARANCE UR: CLEAR
AST SERPL-CCNC: 23 U/L (ref 12–45)
BACTERIA #/AREA URNS HPF: NEGATIVE /HPF
BASOPHILS # BLD AUTO: 0.7 % (ref 0–1.8)
BASOPHILS # BLD: 0.04 K/UL (ref 0–0.12)
BILIRUB SERPL-MCNC: 0.3 MG/DL (ref 0.1–1.5)
BILIRUB UR QL STRIP.AUTO: NEGATIVE
BUN SERPL-MCNC: 21 MG/DL (ref 8–22)
CALCIUM ALBUM COR SERPL-MCNC: 9.1 MG/DL (ref 8.5–10.5)
CALCIUM SERPL-MCNC: 9.1 MG/DL (ref 8.5–10.5)
CHLORIDE SERPL-SCNC: 109 MMOL/L (ref 96–112)
CO2 SERPL-SCNC: 22 MMOL/L (ref 20–33)
COLOR UR: YELLOW
CREAT SERPL-MCNC: 0.81 MG/DL (ref 0.5–1.4)
EKG IMPRESSION: NORMAL
EOSINOPHIL # BLD AUTO: 0.33 K/UL (ref 0–0.51)
EOSINOPHIL NFR BLD: 5.8 % (ref 0–6.9)
EPI CELLS #/AREA URNS HPF: ABNORMAL /HPF
ERYTHROCYTE [DISTWIDTH] IN BLOOD BY AUTOMATED COUNT: 50.9 FL (ref 35.9–50)
GFR SERPLBLD CREATININE-BSD FMLA CKD-EPI: 103 ML/MIN/1.73 M 2
GLOBULIN SER CALC-MCNC: 3.2 G/DL (ref 1.9–3.5)
GLUCOSE SERPL-MCNC: 64 MG/DL (ref 65–99)
GLUCOSE UR STRIP.AUTO-MCNC: NEGATIVE MG/DL
HCG SERPL QL: NEGATIVE
HCT VFR BLD AUTO: 40.9 % (ref 37–47)
HGB BLD-MCNC: 12.7 G/DL (ref 12–16)
HYALINE CASTS #/AREA URNS LPF: ABNORMAL /LPF
IMM GRANULOCYTES # BLD AUTO: 0.01 K/UL (ref 0–0.11)
IMM GRANULOCYTES NFR BLD AUTO: 0.2 % (ref 0–0.9)
KETONES UR STRIP.AUTO-MCNC: NEGATIVE MG/DL
LEUKOCYTE ESTERASE UR QL STRIP.AUTO: NEGATIVE
LIPASE SERPL-CCNC: 44 U/L (ref 11–82)
LYMPHOCYTES # BLD AUTO: 1.85 K/UL (ref 1–4.8)
LYMPHOCYTES NFR BLD: 32.3 % (ref 22–41)
MCH RBC QN AUTO: 24.8 PG (ref 27–33)
MCHC RBC AUTO-ENTMCNC: 31.1 G/DL (ref 32.2–35.5)
MCV RBC AUTO: 79.7 FL (ref 81.4–97.8)
MICRO URNS: ABNORMAL
MONOCYTES # BLD AUTO: 0.35 K/UL (ref 0–0.85)
MONOCYTES NFR BLD AUTO: 6.1 % (ref 0–13.4)
NEUTROPHILS # BLD AUTO: 3.14 K/UL (ref 1.82–7.42)
NEUTROPHILS NFR BLD: 54.9 % (ref 44–72)
NITRITE UR QL STRIP.AUTO: NEGATIVE
NRBC # BLD AUTO: 0 K/UL
NRBC BLD-RTO: 0 /100 WBC (ref 0–0.2)
PH UR STRIP.AUTO: 7.5 [PH] (ref 5–8)
PLATELET # BLD AUTO: 243 K/UL (ref 164–446)
PMV BLD AUTO: 10 FL (ref 9–12.9)
POTASSIUM SERPL-SCNC: 4.2 MMOL/L (ref 3.6–5.5)
PROT SERPL-MCNC: 7.2 G/DL (ref 6–8.2)
PROT UR QL STRIP: NEGATIVE MG/DL
RBC # BLD AUTO: 5.13 M/UL (ref 4.2–5.4)
RBC # URNS HPF: ABNORMAL /HPF
RBC UR QL AUTO: ABNORMAL
SODIUM SERPL-SCNC: 141 MMOL/L (ref 135–145)
SP GR UR STRIP.AUTO: 1.02
UROBILINOGEN UR STRIP.AUTO-MCNC: 0.2 MG/DL
WBC # BLD AUTO: 5.7 K/UL (ref 4.8–10.8)
WBC #/AREA URNS HPF: ABNORMAL /HPF

## 2024-09-30 PROCEDURE — 80053 COMPREHEN METABOLIC PANEL: CPT

## 2024-09-30 PROCEDURE — 81001 URINALYSIS AUTO W/SCOPE: CPT

## 2024-09-30 PROCEDURE — 93005 ELECTROCARDIOGRAM TRACING: CPT

## 2024-09-30 PROCEDURE — 99284 EMERGENCY DEPT VISIT MOD MDM: CPT

## 2024-09-30 PROCEDURE — 84703 CHORIONIC GONADOTROPIN ASSAY: CPT

## 2024-09-30 PROCEDURE — 71045 X-RAY EXAM CHEST 1 VIEW: CPT

## 2024-09-30 PROCEDURE — 85025 COMPLETE CBC W/AUTO DIFF WBC: CPT

## 2024-09-30 PROCEDURE — 93005 ELECTROCARDIOGRAM TRACING: CPT | Performed by: EMERGENCY MEDICINE

## 2024-09-30 PROCEDURE — 83690 ASSAY OF LIPASE: CPT

## 2024-09-30 PROCEDURE — 36415 COLL VENOUS BLD VENIPUNCTURE: CPT

## 2024-09-30 PROCEDURE — 76705 ECHO EXAM OF ABDOMEN: CPT

## 2024-09-30 RX ORDER — OMEPRAZOLE 40 MG/1
40 CAPSULE, DELAYED RELEASE ORAL DAILY
Qty: 30 CAPSULE | Refills: 0 | Status: SHIPPED | OUTPATIENT
Start: 2024-09-30

## 2024-09-30 RX ORDER — SUCRALFATE 1 G/1
1 TABLET ORAL
Qty: 120 TABLET | Refills: 3 | Status: SHIPPED | OUTPATIENT
Start: 2024-09-30

## 2024-09-30 ASSESSMENT — FIBROSIS 4 INDEX: FIB4 SCORE: 0.47

## 2025-02-04 ENCOUNTER — OFFICE VISIT (OUTPATIENT)
Dept: OCCUPATIONAL MEDICINE | Facility: CLINIC | Age: 25
End: 2025-02-04

## 2025-02-04 ENCOUNTER — APPOINTMENT (OUTPATIENT)
Dept: OCCUPATIONAL MEDICINE | Facility: CLINIC | Age: 25
End: 2025-02-04
Payer: COMMERCIAL

## 2025-02-04 ENCOUNTER — HOSPITAL ENCOUNTER (OUTPATIENT)
Facility: MEDICAL CENTER | Age: 25
End: 2025-02-04
Attending: NURSE PRACTITIONER
Payer: COMMERCIAL

## 2025-02-04 DIAGNOSIS — Z02.89 ENCOUNTER FOR OCCUPATIONAL HEALTH ASSESSMENT: ICD-10-CM

## 2025-02-04 DIAGNOSIS — Z02.1 PRE-EMPLOYMENT DRUG SCREENING: ICD-10-CM

## 2025-02-04 DIAGNOSIS — Z02.1 PRE-EMPLOYMENT HEALTH SCREENING EXAMINATION: ICD-10-CM

## 2025-02-04 LAB
AMP AMPHETAMINE: NORMAL
BAR BARBITURATES: NORMAL
BZO BENZODIAZEPINES: NORMAL
COC COCAINE: NORMAL
INT CON NEG: NORMAL
INT CON POS: NORMAL
MDMA ECSTASY: NORMAL
MET METHAMPHETAMINES: NORMAL
MTD METHADONE: NORMAL
OPI OPIATES: NORMAL
OXY OXYCODONE: NORMAL
PCP PHENCYCLIDINE: NORMAL
POC URINE DRUG SCREEN OCDRS: NEGATIVE
THC: NORMAL

## 2025-02-04 PROCEDURE — 86765 RUBEOLA ANTIBODY: CPT | Performed by: NURSE PRACTITIONER

## 2025-02-04 PROCEDURE — 86787 VARICELLA-ZOSTER ANTIBODY: CPT | Performed by: NURSE PRACTITIONER

## 2025-02-04 PROCEDURE — 80305 DRUG TEST PRSMV DIR OPT OBS: CPT | Performed by: NURSE PRACTITIONER

## 2025-02-04 PROCEDURE — 86735 MUMPS ANTIBODY: CPT | Performed by: NURSE PRACTITIONER

## 2025-02-04 PROCEDURE — 8915 PR COMPREHENSIVE PHYSICAL: Performed by: NURSE PRACTITIONER

## 2025-02-04 PROCEDURE — 86480 TB TEST CELL IMMUN MEASURE: CPT | Performed by: NURSE PRACTITIONER

## 2025-02-04 PROCEDURE — 86762 RUBELLA ANTIBODY: CPT | Performed by: NURSE PRACTITIONER

## 2025-02-04 PROCEDURE — 90636 HEP A/HEP B VACC ADULT IM: CPT | Mod: JZ | Performed by: PREVENTIVE MEDICINE

## 2025-02-04 NOTE — PROGRESS NOTES
This patient was seen for a pre-employment MA visit.     Company- [employer]  Position -      Pre-employment drug screen completed - complete  Color blindness test - NA  Quantiferon Gold TB test -collected  Provider Physical - complete  Mask Fit- N95/CAPR/PAPR -NA  Spirometry date -NA     Hand Hygiene form - NA    Vaccines/Titers     Tdap - docs  COVID -19-declination signed  MMR - titer collected   Varicella - titer collected  Hepatitis B - Twinrix given  Hepatitis A - Twinrix given   Flu Shot - declination signed; appt scheduled for Friday 2/7/25   Sticker provided - no  Specialty or other testing needed at this time - Twinrix #2 scheduled for Mar. 3/25    The patient has been informed about and verbalizes understanding of the applicable pre-employment requirements set by the employer. yes  The patient has been informed about and verbalizes understanding that they may be required to return to Occupational Health for additional vaccinations or testing. yes

## 2025-02-05 LAB
GAMMA INTERFERON BACKGROUND BLD IA-ACNC: 0.03 IU/ML
M TB IFN-G BLD-IMP: NEGATIVE
M TB IFN-G CD4+ BCKGRND COR BLD-ACNC: 0 IU/ML
MITOGEN IGNF BCKGRD COR BLD-ACNC: 7.53 IU/ML
QFT TB2 - NIL TBQ2: 0.01 IU/ML

## 2025-02-06 LAB
MEV IGG SER-ACNC: 145 AU/ML
MUV IGG SER IA-ACNC: 247 AU/ML
RUBV AB SER QL: 86.2 IU/ML
VZV IGG SER IA-ACNC: 1.4 S/CO

## 2025-02-11 ENCOUNTER — NON-PROVIDER VISIT (OUTPATIENT)
Dept: OCCUPATIONAL MEDICINE | Facility: CLINIC | Age: 25
End: 2025-02-11

## 2025-02-11 DIAGNOSIS — Z23 NEED FOR VACCINATION: Primary | ICD-10-CM

## 2025-02-11 PROCEDURE — 90656 IIV3 VACC NO PRSV 0.5 ML IM: CPT | Performed by: PREVENTIVE MEDICINE

## 2025-05-19 ENCOUNTER — HOSPITAL ENCOUNTER (EMERGENCY)
Facility: MEDICAL CENTER | Age: 25
End: 2025-05-19
Attending: EMERGENCY MEDICINE
Payer: COMMERCIAL

## 2025-05-19 ENCOUNTER — PHARMACY VISIT (OUTPATIENT)
Dept: PHARMACY | Facility: MEDICAL CENTER | Age: 25
End: 2025-05-19
Payer: COMMERCIAL

## 2025-05-19 ENCOUNTER — OFFICE VISIT (OUTPATIENT)
Dept: URGENT CARE | Facility: CLINIC | Age: 25
End: 2025-05-19
Payer: COMMERCIAL

## 2025-05-19 VITALS
HEIGHT: 68 IN | HEART RATE: 86 BPM | BODY MASS INDEX: 30.27 KG/M2 | WEIGHT: 199.74 LBS | RESPIRATION RATE: 16 BRPM | OXYGEN SATURATION: 99 % | TEMPERATURE: 98.5 F | SYSTOLIC BLOOD PRESSURE: 109 MMHG | DIASTOLIC BLOOD PRESSURE: 66 MMHG

## 2025-05-19 DIAGNOSIS — K08.89 PAIN, DENTAL: Primary | ICD-10-CM

## 2025-05-19 PROCEDURE — RXMED WILLOW AMBULATORY MEDICATION CHARGE: Performed by: EMERGENCY MEDICINE

## 2025-05-19 PROCEDURE — 99282 EMERGENCY DEPT VISIT SF MDM: CPT

## 2025-05-19 RX ORDER — PENICILLIN V POTASSIUM 500 MG/1
500 TABLET, FILM COATED ORAL 4 TIMES DAILY
Qty: 40 TABLET | Refills: 0 | Status: ACTIVE | OUTPATIENT
Start: 2025-05-19

## 2025-05-19 RX ORDER — IBUPROFEN 800 MG/1
800 TABLET, FILM COATED ORAL EVERY 8 HOURS PRN
Qty: 30 TABLET | Refills: 0 | Status: SHIPPED | OUTPATIENT
Start: 2025-05-19

## 2025-05-19 RX ORDER — TRAMADOL HYDROCHLORIDE 50 MG/1
50 TABLET ORAL EVERY 6 HOURS PRN
Qty: 10 TABLET | Refills: 0 | Status: SHIPPED | OUTPATIENT
Start: 2025-05-19 | End: 2025-05-22

## 2025-05-19 ASSESSMENT — PAIN DESCRIPTION - PAIN TYPE
TYPE: ACUTE PAIN
TYPE: ACUTE PAIN

## 2025-05-19 ASSESSMENT — FIBROSIS 4 INDEX: FIB4 SCORE: 0.44

## 2025-05-19 NOTE — ED NOTES
Pt ambulatory to Ort 1 with steady gait. Pt placed on monitor. Interpretor at bedside. Chart up for ERP.

## 2025-05-19 NOTE — ED PROVIDER NOTES
"ED Provider Note    CHIEF COMPLAINT  Chief Complaint   Patient presents with    Dental Pain     Reports she was eating and broke her right lower molar on Thursday. Increasing pain since. Pain radiating to R side of face and head. Denies fevers chills.        EXTERNAL RECORDS REVIEWED  Outpatient Notes  none    HPI/ROS  LIMITATION TO HISTORY   None  OUTSIDE HISTORIAN(S):  None    Mamie Lambert is a 25 y.o. female who presents here for evaluation of dental pain.  Patient states via the , that she has had right lower jaw and dental pain over the last 2 days.  Patient has no fever chills or vomiting, she does have pain with chewing the back molar.  States that she may have \"chipped a tooth\".  No trouble swallowing, no facial swelling.    PAST MEDICAL HISTORY   No bleeding disorders    SURGICAL HISTORY  patient denies any surgical history    FAMILY HISTORY  History reviewed. No pertinent family history.    SOCIAL HISTORY  Social History     Tobacco Use    Smoking status: Never    Smokeless tobacco: Never   Substance and Sexual Activity    Alcohol use: Not Currently    Drug use: Not Currently    Sexual activity: Not on file       CURRENT MEDICATIONS  Home Medications       Reviewed by Geneva Cheema R.N. (Registered Nurse) on 05/19/25 at 0842  Med List Status: Partial     Medication Last Dose Status   Doxylamine Succinate, Sleep, (UNISOM PO)  Active   omeprazole (PRILOSEC) 40 MG delayed-release capsule  Active   phenazopyridine (PYRIDIUM) 200 MG Tab  Active   sucralfate (CARAFATE) 1 GM Tab  Active                    ALLERGIES  Allergies[1]    PHYSICAL EXAM  VITAL SIGNS: /66   Pulse 86   Temp 36.9 °C (98.5 °F) (Temporal)   Resp 16   Ht 1.727 m (5' 8\")   Wt 90.6 kg (199 lb 11.8 oz)   LMP 04/21/2025 (Approximate)   SpO2 99%   BMI 30.37 kg/m²    Constitutional: Well developed, well nourished. No acute distress.  HEENT: Normocephalic, atraumatic. Posterior pharynx clear and moist.  Dental " caries noted, no abscess  Eyes:  EOMI. Normal sclera.  Neck: Supple, Full range of motion, nontender.  Musculoskeletal: No deformity, no edema, neurovascular intact.   Neuro: Clear speech, appropriate, cooperative, cranial nerves II-XII grossly intact.  Psych: Normal mood and affect      EKG/LABS  None    RADIOLOGY/PROCEDURES   None      COURSE & MEDICAL DECISION MAKING    ASSESSMENT, COURSE AND PLAN  Care Narrative: This is a 25-year-old female here for dental pain.  Patient has right lower dental pain, there is no drainable abscess, patient was placed on antibiotics, pain meds upstairs.  She is nontoxic-appearing afebrile comfortable plan, no facial swelling, and no troubles with breathing or swallowing.        DISPOSITION AND DISCUSSIONS  I have discussed management of the patient with the following physicians and YONATHAN's: None    Discussion of management with other QHP or appropriate source(s): None    Escalation of care considered, and ultimately not performed: None    Barriers to care at this time, including but not limited to: None.     Decision tools and prescription drugs considered including, but not limited to: None.    FINAL DIAGNOSIS  1. Pain, dental         Electronically signed by: Juan Jose Mc D.O., 5/19/2025 9:17 AM           [1] No Known Allergies

## 2025-05-19 NOTE — ED TRIAGE NOTES
Chief Complaint   Patient presents with    Dental Pain     Reports she was eating and broke her right lower molar on Thursday. Increasing pain since. Pain radiating to R side of face and head. Denies fevers chills.      Pt ambulates to triage for above.      used for encounter Cuca #: [id number]    Educated on triage process, returned to ED lobby.

## 2025-05-19 NOTE — Clinical Note
Mamie Kamara Lambert was seen and treated in our emergency department on 5/19/2025.  She may return to work on 05/22/2025.       If you have any questions or concerns, please don't hesitate to call.      Juan Jose Mc D.O.